# Patient Record
Sex: FEMALE | URBAN - METROPOLITAN AREA
[De-identification: names, ages, dates, MRNs, and addresses within clinical notes are randomized per-mention and may not be internally consistent; named-entity substitution may affect disease eponyms.]

---

## 2024-03-14 ENCOUNTER — HOSPITAL ENCOUNTER (OUTPATIENT)
Age: 29
Setting detail: SPECIMEN
Discharge: HOME | End: 2024-03-14

## 2024-03-14 ENCOUNTER — OFFICE VISIT (OUTPATIENT)
Dept: OBGYN CLINIC | Age: 29
End: 2024-03-14

## 2024-03-14 VITALS
SYSTOLIC BLOOD PRESSURE: 116 MMHG | DIASTOLIC BLOOD PRESSURE: 80 MMHG | BODY MASS INDEX: 36.84 KG/M2 | WEIGHT: 215.8 LBS | HEIGHT: 64 IN

## 2024-03-14 DIAGNOSIS — Z32.01 POSITIVE PREGNANCY TEST: ICD-10-CM

## 2024-03-14 DIAGNOSIS — Z3A.09 9 WEEKS GESTATION OF PREGNANCY: ICD-10-CM

## 2024-03-14 DIAGNOSIS — Z32.01 POSITIVE PREGNANCY TEST: Primary | ICD-10-CM

## 2024-03-14 LAB
ABO + RH BLD: NORMAL
BLOOD GROUP ANTIBODIES SERPL: NEGATIVE
CANDIDA SPECIES: NEGATIVE
GARDNERELLA VAGINALIS: NEGATIVE
HGB ELECTROPHORESIS INTERP: NORMAL
HIV 1+2 AB+HIV1 P24 AG SERPL QL IA: NONREACTIVE
PATHOLOGIST: NORMAL
SOURCE: NORMAL
TRICHOMONAS: NEGATIVE

## 2024-03-14 PROCEDURE — 0500F INITIAL PRENATAL CARE VISIT: CPT | Performed by: NURSE PRACTITIONER

## 2024-03-14 NOTE — PROGRESS NOTES
Chaperone for Intimate Exam  Chaperone was offered as part of the rooming process. Patient declined and agrees to continue with exam without a chaperone.  Chaperone: NONE       
are not limited to increased risks of  labor,  delivery, premature rupture of membranes, intrauterine growth restriction, intrauterine fetal demise and abruptio placenta. Secondary smoke risks were also reviewed. Increases in cancer, respiratory problems, and sudden infant death syndrome were reviewed as well.    Prenatal screening was discussed, including cell free DNA tests. Benefits of the above testing was reviewed. Risks, Benefits and non-invasive alternative testing was reviewed.     The patient was questioned in detail regarding any genetic misnomer history, chromosomal abnormalities, or learning disabilities in  herself, the father of the baby or their families. SHE DENIED ANY HISTORY AS STATED ABOVE: YES    Upon completion of the visit all questions were answered and the patients follow-up and testing schedule were reviewed.      LUCILA Hemphill CNP Ob/Gyn   3/14/2024, 3:07 PM

## 2024-03-15 LAB
BASOPHILS # BLD: 0.04 K/UL (ref 0–0.2)
BASOPHILS NFR BLD: 1 % (ref 0–2)
C TRACH DNA SPEC QL PROBE+SIG AMP: NEGATIVE
EOSINOPHIL # BLD: 0.04 K/UL (ref 0–0.44)
EOSINOPHILS RELATIVE PERCENT: 1 % (ref 1–4)
ERYTHROCYTE [DISTWIDTH] IN BLOOD BY AUTOMATED COUNT: 13.3 % (ref 11.8–14.4)
HBV SURFACE AG SERPL QL IA: NONREACTIVE
HCT VFR BLD AUTO: 39.8 % (ref 36.3–47.1)
HCV AB SERPL QL IA: NONREACTIVE
HGB BLD-MCNC: 13 G/DL (ref 11.9–15.1)
IMM GRANULOCYTES # BLD AUTO: <0.03 K/UL (ref 0–0.3)
IMM GRANULOCYTES NFR BLD: 0 %
LYMPHOCYTES NFR BLD: 2.44 K/UL (ref 1.1–3.7)
LYMPHOCYTES RELATIVE PERCENT: 35 % (ref 24–43)
MCH RBC QN AUTO: 27.2 PG (ref 25.2–33.5)
MCHC RBC AUTO-ENTMCNC: 32.7 G/DL (ref 28.4–34.8)
MCV RBC AUTO: 83.3 FL (ref 82.6–102.9)
MONOCYTES NFR BLD: 0.44 K/UL (ref 0.1–1.2)
MONOCYTES NFR BLD: 6 % (ref 3–12)
N GONORRHOEA DNA SPEC QL PROBE+SIG AMP: NEGATIVE
NEUTROPHILS NFR BLD: 57 % (ref 36–65)
NEUTS SEG NFR BLD: 3.97 K/UL (ref 1.5–8.1)
NRBC BLD-RTO: 0 PER 100 WBC
PLATELET # BLD AUTO: 303 K/UL (ref 138–453)
PMV BLD AUTO: 10.5 FL (ref 8.1–13.5)
RBC # BLD AUTO: 4.78 M/UL (ref 3.95–5.11)
RUBV IGG SERPL QL IA: >500 IU/ML
SPECIMEN DESCRIPTION: NORMAL
T PALLIDUM AB SER QL IA: NONREACTIVE
WBC OTHER # BLD: 6.9 K/UL (ref 3.5–11.3)

## 2024-03-18 LAB
HGB ELECTROPHORESIS INTERP: NORMAL
PATHOLOGIST: NORMAL

## 2024-03-21 LAB
CFTR ALLELE 1 BLD/T QL: NEGATIVE
CFTR ALLELE 2 BLD/T QL: NEGATIVE
CFTR MUT ANL BLD/T: NORMAL
CFTR MUT ANL BLD/T: NORMAL

## 2024-03-25 ENCOUNTER — HOSPITAL ENCOUNTER (OUTPATIENT)
Age: 29
Setting detail: SPECIMEN
Discharge: HOME | End: 2024-03-25

## 2024-03-25 ENCOUNTER — INITIAL PRENATAL (OUTPATIENT)
Dept: OBGYN CLINIC | Age: 29
End: 2024-03-25

## 2024-03-25 VITALS
SYSTOLIC BLOOD PRESSURE: 104 MMHG | DIASTOLIC BLOOD PRESSURE: 68 MMHG | HEIGHT: 64 IN | WEIGHT: 215 LBS | BODY MASS INDEX: 36.7 KG/M2

## 2024-03-25 DIAGNOSIS — Z3A.10 10 WEEKS GESTATION OF PREGNANCY: ICD-10-CM

## 2024-03-25 DIAGNOSIS — Z32.01 POSITIVE PREGNANCY TEST: ICD-10-CM

## 2024-03-25 DIAGNOSIS — Z76.89 ENCOUNTER TO ESTABLISH CARE: Primary | ICD-10-CM

## 2024-03-25 DIAGNOSIS — Z34.01 PRIMIPARITY, FIRST TRIMESTER: ICD-10-CM

## 2024-03-25 DIAGNOSIS — Z82.0 FAMILY HISTORY OF MS (MULTIPLE SCLEROSIS): ICD-10-CM

## 2024-03-25 DIAGNOSIS — Z3A.09 9 WEEKS GESTATION OF PREGNANCY: ICD-10-CM

## 2024-03-25 LAB
ALBUMIN SERPL-MCNC: 4.1 G/DL (ref 3.5–5.2)
ALBUMIN/GLOB SERPL: 2 {RATIO} (ref 1–2.5)
ALP SERPL-CCNC: 62 U/L (ref 35–104)
ALT SERPL-CCNC: 19 U/L (ref 10–35)
AMPHET UR QL SCN: NEGATIVE
ANION GAP SERPL CALCULATED.3IONS-SCNC: 10 MMOL/L (ref 9–16)
AST SERPL-CCNC: 16 U/L (ref 10–35)
BACTERIA URNS QL MICRO: NORMAL
BARBITURATES UR QL SCN: NEGATIVE
BENZODIAZ UR QL: NEGATIVE
BILIRUB SERPL-MCNC: 0.3 MG/DL (ref 0–1.2)
BILIRUB UR QL STRIP: NEGATIVE
BUN SERPL-MCNC: 8 MG/DL (ref 6–20)
CALCIUM SERPL-MCNC: 9.3 MG/DL (ref 8.6–10.4)
CANNABINOIDS UR QL SCN: NEGATIVE
CASTS #/AREA URNS LPF: NORMAL /LPF (ref 0–8)
CHLORIDE SERPL-SCNC: 103 MMOL/L (ref 98–107)
CLARITY UR: CLEAR
CO2 SERPL-SCNC: 23 MMOL/L (ref 20–31)
COCAINE UR QL SCN: NEGATIVE
COLOR UR: YELLOW
CREAT SERPL-MCNC: 0.7 MG/DL (ref 0.5–0.9)
CREAT UR-MCNC: 153 MG/DL (ref 28–217)
EPI CELLS #/AREA URNS HPF: NORMAL /HPF (ref 0–5)
FENTANYL UR QL: NEGATIVE
GFR SERPL CREATININE-BSD FRML MDRD: >90 ML/MIN/1.73M2
GLUCOSE SERPL-MCNC: 87 MG/DL (ref 74–99)
GLUCOSE UR STRIP-MCNC: NEGATIVE MG/DL
HGB UR QL STRIP.AUTO: NEGATIVE
KETONES UR STRIP-MCNC: NEGATIVE MG/DL
LEUKOCYTE ESTERASE UR QL STRIP: NEGATIVE
METHADONE UR QL: NEGATIVE
NITRITE UR QL STRIP: NEGATIVE
OPIATES UR QL SCN: NEGATIVE
OXYCODONE UR QL SCN: NEGATIVE
PCP UR QL SCN: NEGATIVE
PH UR STRIP: 5.5 [PH] (ref 5–8)
POTASSIUM SERPL-SCNC: 4.6 MMOL/L (ref 3.7–5.3)
PROT SERPL-MCNC: 6.8 G/DL (ref 6.6–8.7)
PROT UR STRIP-MCNC: NEGATIVE MG/DL
RBC #/AREA URNS HPF: NORMAL /HPF (ref 0–4)
SODIUM SERPL-SCNC: 136 MMOL/L (ref 136–145)
SP GR UR STRIP: 1.02 (ref 1–1.03)
TEST INFORMATION: NORMAL
TOTAL PROTEIN, URINE: 8 MG/DL
URINE TOTAL PROTEIN CREATININE RATIO: 0.05
UROBILINOGEN UR STRIP-ACNC: NORMAL EU/DL (ref 0–1)
WBC #/AREA URNS HPF: NORMAL /HPF (ref 0–5)

## 2024-03-25 PROCEDURE — 0500F INITIAL PRENATAL CARE VISIT: CPT | Performed by: OBSTETRICS & GYNECOLOGY

## 2024-03-25 RX ORDER — CALCIUM CARBONATE 500 MG/1
1 TABLET, CHEWABLE ORAL DAILY
COMMUNITY

## 2024-03-25 NOTE — PROGRESS NOTES
Relationship with FOB: fiance, living together, first pregnancy together, no other children  Partner's name:Mark  Plans to Breast or bottle:undecided   Pain Score:0/10  Job title:  This is a planned pregnancy:Yes  Certain LMP:Yes   S/S of pregnancy:Yes, nausea   Hx N/V pregnancy: Nausea mild mod and has resolved.       Mother's ethnicity: /   Father's ethnicity:        There is no problem list on file for this patient.    Blood pressure 104/68, height 1.626 m (5' 4\"), weight 97.5 kg (215 lb), last menstrual period 2024.      Jennifer Ramirez is a 28 y.o. , here for her ACOG. The patients past medical, surgical, social and family history were reviewed.  Current medications and allergies were reviewed, and documented in the chart.    Menstrual history: regular  Birth control: BCP.     Wt Readings from Last 3 Encounters:   24 97.5 kg (215 lb)   24 97.9 kg (215 lb 12.8 oz)     Recent Results (from the past 8736 hour(s))   Hepatitis C Antibody    Collection Time: 24  3:10 PM   Result Value Ref Range    Hepatitis C Ab NONREACTIVE NONREACTIVE   HIV Screen    Collection Time: 24  3:10 PM   Result Value Ref Range    HIV Ag/Ab NONREACTIVE NONREACTIVE   Prenatal Profile I    Collection Time: 24  3:10 PM   Result Value Ref Range    WBC 6.9 3.5 - 11.3 k/uL    RBC 4.78 3.95 - 5.11 m/uL    Hemoglobin 13.0 11.9 - 15.1 g/dL    Hematocrit 39.8 36.3 - 47.1 %    MCV 83.3 82.6 - 102.9 fL    MCH 27.2 25.2 - 33.5 pg    MCHC 32.7 28.4 - 34.8 g/dL    RDW 13.3 11.8 - 14.4 %    Platelets 303 138 - 453 k/uL    MPV 10.5 8.1 - 13.5 fL    NRBC Automated 0.0 0.0 per 100 WBC    Neutrophils % 57 36 - 65 %    Lymphocytes % 35 24 - 43 %    Monocytes % 6 3 - 12 %    Eosinophils % 1 1 - 4 %    Basophils % 1 0 - 2 %    Immature Granulocytes 0 0 %    Neutrophils Absolute 3.97 1.50 - 8.10 k/uL    Lymphocytes Absolute 2.44 1.10 - 3.70 k/uL    Monocytes Absolute 0.44

## 2024-03-26 ENCOUNTER — TELEPHONE (OUTPATIENT)
Dept: OBGYN CLINIC | Age: 29
End: 2024-03-26

## 2024-03-26 LAB
MICROORGANISM SPEC CULT: NO GROWTH
SPECIMEN DESCRIPTION: NORMAL

## 2024-03-26 NOTE — TELEPHONE ENCOUNTER
LMP: 01/09/24    11 wks    Last Seen: 03/26/24    Next Appt: 04/22/24    C/O  Small blood clot after BM   Pt unsure if from rectum or vagina   No cramps    Informed pt some spotting can be normal in pregnancy and would f/u w/ provider for any other recommendations.    Advised pt to monitor and reach out to office w/ any changes in symptoms. Pt can take warm bath or use a moderate to low heating pad in intervals for body aches and pains if present. Increase water intake w/ electrolytes.        Please Advise

## 2024-03-27 NOTE — TELEPHONE ENCOUNTER
Pt aware, pt asked if it would be okay to take fiber pill or if she should focus on more natural based fibers.       Advised pt she could send us the name of fiber pill to ensure that it is adequate.

## 2024-03-31 LAB
Lab: NORMAL
NTRA FETAL FRACTION: NORMAL
NTRA GENDER OF FETUS: NORMAL
NTRA MONOSOMY X AGE-BASED RISK TEXT: NORMAL
NTRA MONOSOMY X RESULT TEXT: NORMAL
NTRA MONOSOMY X RISK SCORE TEXT: NORMAL
NTRA TRIPLOIDY RESULT TEXT: NORMAL
NTRA TRISOMY 13 AGE-BASED RISK TEXT: NORMAL
NTRA TRISOMY 13 RESULT TEXT: NORMAL
NTRA TRISOMY 13 RISK SCORE TEXT: NORMAL
NTRA TRISOMY 18 AGE-BASED RISK TEXT: NORMAL
NTRA TRISOMY 18 RESULT TEXT: NORMAL
NTRA TRISOMY 18 RISK SCORE TEXT: NORMAL
NTRA TRISOMY 21 AGE-BASED RISK TEXT: NORMAL
NTRA TRISOMY 21 RESULT TEXT: NORMAL
NTRA TRISOMY 21 RISK SCORE TEXT: NORMAL

## 2024-04-08 ENCOUNTER — TELEPHONE (OUTPATIENT)
Dept: OBGYN CLINIC | Age: 29
End: 2024-04-08

## 2024-04-08 NOTE — TELEPHONE ENCOUNTER
LMP 1/9/24    Last Seen:3/25/24    Next Appt: 4/22/24        Patient says when she was here for her last visit she wasn't sure if she was told to take a baby aspirin everyday.    Please advise.

## 2024-04-19 NOTE — PROGRESS NOTES
Prenatal Visit    Jennifer Ramirez is a 28 y.o. female  at 14w6d    Subjective:  The patient was seen and evaluated. Reports Negative fetal movements. She denies abdominal pain, vaginal bleeding and leakage of fluid. Signs and symptoms of  labor as well as labor were reviewed. Dates were reviewed with the patient. Estimated Date of Delivery: 10/15/24          The patient declined the influenza vaccine this year.     The problem list reflects the active issues addressed during today's visit    Her only concern is fatigue    VITALS:    BP: 115/75  Weight - Scale: 98.2 kg (216 lb 6.4 oz)  Fetal HR: 156  Movement: Absent       Assessment & Plan:  Jennifer Ramirez is a 28 y.o. female  at 14w6d   - An 18-22 week anatomy ultrasound has been ordered   - NIPT low risk, male   - Continue taking Prenatal Vitamin.   - continue aspirin 81 mg daily    - The ACIP recommended pregnant patients be included in phase 1C of vaccine distribution. This decision is supported by [facility] and ACOG. As of 2021, there have been over 30,000 pregnant patients included in the V-safe post COVID vaccination safety . Most (73%) reports to VAERS among pregnant women involved non-pregnancyspecific adverse events (e.g., local and systemic reactions). Miscarriage was the most frequently reported pregnancy-specific adverse event to VAERS; numbers are within the known background rates based on presumed COVID-19 vaccine doses administered to pregnant women. No unexpected pregnancy or infant outcomes have been observed related to  COVID-19 vaccination during pregnancy. Recommended patient proceed with vaccination.       Patient Active Problem List    Diagnosis Date Noted    Obesity (BMI 35.0-39.9 without comorbidity) 2024     Aspirin 81 mg daily  NSTs at 37 weeks       Return in about 4 weeks (around 2024) for Repeat OB.    LUCILA Hemphill - CNP  [facility]   2024, 7:50 AM

## 2024-04-22 ENCOUNTER — ROUTINE PRENATAL (OUTPATIENT)
Dept: OBGYN CLINIC | Age: 29
End: 2024-04-22

## 2024-04-22 VITALS — WEIGHT: 216.4 LBS | BODY MASS INDEX: 37.14 KG/M2 | SYSTOLIC BLOOD PRESSURE: 115 MMHG | DIASTOLIC BLOOD PRESSURE: 75 MMHG

## 2024-04-22 DIAGNOSIS — E66.9 OBESITY (BMI 35.0-39.9 WITHOUT COMORBIDITY): ICD-10-CM

## 2024-04-22 DIAGNOSIS — Z34.02 ENCOUNTER FOR SUPERVISION OF NORMAL FIRST PREGNANCY IN SECOND TRIMESTER: Primary | ICD-10-CM

## 2024-04-22 DIAGNOSIS — Z3A.14 14 WEEKS GESTATION OF PREGNANCY: ICD-10-CM

## 2024-04-22 PROCEDURE — 0502F SUBSEQUENT PRENATAL CARE: CPT | Performed by: NURSE PRACTITIONER

## 2024-04-22 RX ORDER — ASPIRIN 81 MG/1
81 TABLET, CHEWABLE ORAL DAILY
COMMUNITY

## 2024-05-17 NOTE — PROGRESS NOTES
Prenatal Visit    Jennifer Ramirez is a 28 y.o. female  at 18w6d    The patient was seen and evaluated. She complains of not sleeping well however that was a problem outside of pregnancy as well. She reports positive fetal movements. She denies contractions, vaginal bleeding and leakage of fluid. Signs and symptoms of labor and pre-eclampsia were reviewed with the patient in detail. She is to report any of these if they occur. She currently denies signs or symptoms of pre-eclampsia including headache, vision changes, RUQ pain.    The problem list reflects the active issues addressed during today's visit    Vitals:  Vitals:    24 0859   BP: 109/64   Site: Left Upper Arm   Position: Sitting   Cuff Size: Large Adult   Weight: 98 kg (216 lb)       BP: 109/64  Weight - Scale: 98 kg (216 lb)       Assessment & Plan:  Jennifer Ramirez is a 28 y.o. female  at 18w6d   - An 18-22 week anatomy ultrasound has been ordered- scheduled for 6/3   - Discussed Melatonin or Benadryl prn for sleep as well as pregnancy pillow.     Patient Active Problem List    Diagnosis Date Noted    Obesity (BMI 35.0-39.9 without comorbidity) 2024     Aspirin 81 mg daily  NSTs at 37 weeks           AYE CARRERA DO  Ob/Gyn   [facility], [place]  2024, 9:11 AM

## 2024-05-20 ENCOUNTER — ROUTINE PRENATAL (OUTPATIENT)
Dept: OBGYN CLINIC | Age: 29
End: 2024-05-20

## 2024-05-20 VITALS — WEIGHT: 216 LBS | DIASTOLIC BLOOD PRESSURE: 64 MMHG | BODY MASS INDEX: 37.08 KG/M2 | SYSTOLIC BLOOD PRESSURE: 109 MMHG

## 2024-05-20 DIAGNOSIS — Z3A.18 18 WEEKS GESTATION OF PREGNANCY: Primary | ICD-10-CM

## 2024-05-20 PROCEDURE — 0502F SUBSEQUENT PRENATAL CARE: CPT | Performed by: OBSTETRICS & GYNECOLOGY

## 2024-05-23 NOTE — TELEPHONE ENCOUNTER
Dr. Rosado, patient interested in free prenatal vitamins and iron per response from Maternity Risk Survey.    Order for BS Baby Box pending for your convenience.    Thank you,  Karen Horton, PharmD  Ambulatory Care Clinical Pharmacist- [facility] Clinical Pharmacy  Department, toll free: [phone]  [facility]      =======================================================================    [facility] CLINICAL PHARMACY REVIEW - Be Well With Baby    SUBJECTIVE  Jennifer Ramirez is a 28 y.o. female currently identified as interested in receiving a [facility] \"Baby Box\" containing a 1 year supply of prenatal vitamins from [facility].    Contents of Baby Box:  Welcome Letter  6 month supply of Nature's Blend Prenatal Multivitamin with Minerals (Take 1 softgel once daily) with 1 refill    Thank you  Karen Horton, PharmALBERT  Ambulatory Care Clinical Pharmacist- [facility] Clinical Pharmacy  Department, toll free: [phone]  [facility]

## 2024-06-03 ENCOUNTER — ROUTINE PRENATAL (OUTPATIENT)
Dept: PERINATAL CARE | Age: 29
End: 2024-06-03
Payer: COMMERCIAL

## 2024-06-03 ENCOUNTER — TELEPHONE (OUTPATIENT)
Dept: OBGYN CLINIC | Age: 29
End: 2024-06-03

## 2024-06-03 VITALS
HEIGHT: 64 IN | RESPIRATION RATE: 16 BRPM | DIASTOLIC BLOOD PRESSURE: 68 MMHG | WEIGHT: 217 LBS | TEMPERATURE: 98.1 F | HEART RATE: 80 BPM | SYSTOLIC BLOOD PRESSURE: 114 MMHG | BODY MASS INDEX: 37.05 KG/M2

## 2024-06-03 DIAGNOSIS — E66.9 OBESITY (BMI 35.0-39.9 WITHOUT COMORBIDITY): ICD-10-CM

## 2024-06-03 DIAGNOSIS — Z36.86 ENCOUNTER FOR SCREENING FOR RISK OF PRE-TERM LABOR: ICD-10-CM

## 2024-06-03 DIAGNOSIS — O28.3 NUCHAL FOLD THICKENING ON PRENATAL ULTRASOUND: Primary | ICD-10-CM

## 2024-06-03 DIAGNOSIS — Z3A.20 20 WEEKS GESTATION OF PREGNANCY: ICD-10-CM

## 2024-06-03 DIAGNOSIS — O99.212 OBESITY AFFECTING PREGNANCY IN SECOND TRIMESTER, UNSPECIFIED OBESITY TYPE: ICD-10-CM

## 2024-06-03 PROCEDURE — 99999 PR OFFICE/OUTPT VISIT,PROCEDURE ONLY: CPT | Performed by: OBSTETRICS & GYNECOLOGY

## 2024-06-03 PROCEDURE — 76811 OB US DETAILED SNGL FETUS: CPT | Performed by: OBSTETRICS & GYNECOLOGY

## 2024-06-03 PROCEDURE — 76817 TRANSVAGINAL US OBSTETRIC: CPT | Performed by: OBSTETRICS & GYNECOLOGY

## 2024-06-03 NOTE — TELEPHONE ENCOUNTER
20.6wks     Pt calling in asking if you could explain what her results mean in more detail that she had done at  office as a resident came and talked to her but did not explain anything well to her in regards to the Thickened Nuchal fold that was found on her US today. Pt states they were not very helpful and that she called their office for more detailed information and they told her to call our office for her answers.     Pls advise.

## 2024-06-03 NOTE — PROGRESS NOTES
Obstetric/Gynecology Maternal Fetal Medicine Resident Note    Patient seen in MFM office today for new findings of thickened nuchal fold on scan today. Patient has opted for formal consultation.     Lupe Ojeda MD  OBGYN Resident, PGY1  [facility]  6/3/2024, 12:12 PM

## 2024-06-06 ENCOUNTER — TELEPHONE (OUTPATIENT)
Dept: OBGYN CLINIC | Age: 29
End: 2024-06-06

## 2024-06-06 NOTE — TELEPHONE ENCOUNTER
Bonnie from [facility] is stating patient is wanting a second opinion from them. She was told by Keira's office baby has a thickened Nuchal fold found on ultrasound. Should I put in a referral?

## 2024-06-18 ENCOUNTER — ROUTINE PRENATAL (OUTPATIENT)
Dept: OBGYN CLINIC | Age: 29
End: 2024-06-18

## 2024-06-18 VITALS
DIASTOLIC BLOOD PRESSURE: 80 MMHG | SYSTOLIC BLOOD PRESSURE: 120 MMHG | BODY MASS INDEX: 37.42 KG/M2 | HEIGHT: 64 IN | WEIGHT: 219.2 LBS

## 2024-06-18 DIAGNOSIS — E66.01 SEVERE OBESITY DUE TO EXCESS CALORIES AFFECTING PREGNANCY IN SECOND TRIMESTER (HCC): ICD-10-CM

## 2024-06-18 DIAGNOSIS — O09.92 SUPERVISION OF HIGH RISK PREGNANCY IN SECOND TRIMESTER: ICD-10-CM

## 2024-06-18 DIAGNOSIS — Z3A.23 23 WEEKS GESTATION OF PREGNANCY: ICD-10-CM

## 2024-06-18 DIAGNOSIS — O28.3 NUCHAL FOLD THICKENING ON PRENATAL ULTRASOUND: Primary | ICD-10-CM

## 2024-06-18 DIAGNOSIS — O99.212 SEVERE OBESITY DUE TO EXCESS CALORIES AFFECTING PREGNANCY IN SECOND TRIMESTER (HCC): ICD-10-CM

## 2024-06-18 PROBLEM — E66.9 OBESITY (BMI 35.0-39.9 WITHOUT COMORBIDITY): Status: RESOLVED | Noted: 2024-04-22 | Resolved: 2024-06-18

## 2024-06-18 PROCEDURE — 0502F SUBSEQUENT PRENATAL CARE: CPT | Performed by: STUDENT IN AN ORGANIZED HEALTH CARE EDUCATION/TRAINING PROGRAM

## 2024-06-18 NOTE — PROGRESS NOTES
vaccination during pregnancy. Recommended patient proceed with vaccination.    -Patient will decide if she would like to follow-up with her future scans in the maternal-fetal medicine office or switch offices.  Will place referral should patient desire.          Patient Active Problem List    Diagnosis Date Noted    BMI 37 06/18/2024     ASA 81 mg. NSTs at 37 weeks      Nuchal fold thickening on prenatal ultrasound 06/18/2024     NIPT Low Risk       Return in about 4 weeks (around 7/16/2024) for DO Barby Jamil Ob/Gyn   6/18/2024, 11:47 AM

## 2024-07-03 ENCOUNTER — ROUTINE PRENATAL (OUTPATIENT)
Dept: PERINATAL CARE | Age: 29
End: 2024-07-03

## 2024-07-03 VITALS
TEMPERATURE: 97.9 F | DIASTOLIC BLOOD PRESSURE: 76 MMHG | WEIGHT: 220 LBS | RESPIRATION RATE: 16 BRPM | BODY MASS INDEX: 37.56 KG/M2 | SYSTOLIC BLOOD PRESSURE: 117 MMHG | HEIGHT: 64 IN

## 2024-07-03 DIAGNOSIS — O28.3 NUCHAL FOLD THICKENING ON PRENATAL ULTRASOUND: Primary | ICD-10-CM

## 2024-07-03 DIAGNOSIS — Z3A.25 25 WEEKS GESTATION OF PREGNANCY: ICD-10-CM

## 2024-07-13 NOTE — PROGRESS NOTES
Jennifer is a  @ 26w6d who presents for TALYA visit.  She denies LOF, VB or Ctxs.  + FM.  She denies any complaints except for some occasional cramping.  She denies any fevers/chills, SOB, cough, sore throat, loss of taste/smell or sick contacts.  Pt denies any HA, vision changes or RUQ pain.     O:  Vitals:    07/15/24 0804   BP: 117/82   Pulse: (!) 102     Gen: NAD  Abd: soft, nontender, gravid  Ext:  no edema      BP: 117/82  Weight - Scale: 100.2 kg (221 lb)  Pulse: (!) 102  Patient Position: Sitting  Fundal Height (cm): 26 cm  Fetal HR: 155  Movement: Present    A/P:  Patient Active Problem List    Diagnosis Date Noted    BMI 37 2024     ASA 81 mg. NSTs at 37 weeks      Nuchal fold thickening on prenatal ultrasound 2024     NIPT Low Risk       - Discussed updated COVID precautions and policies. Reviewed updated visitor policy. Encouraged social distancing and appropriate hand washing/hygiene practices. Reviewed symptoms suspicious for COVID infection. Discussed that ACOG, SMFM, and the CDC recommend to not withold immunization in pregnant and breastfeeding women who meet criteria for receipt of the vaccine based on the ACIP recommended priority groups. All questions answered. Patient vocalized understanding.    - RSV vaccination (32-36 weeks): The patient was counseled on benefits to her baby if she receives the Pfizer RSV vaccine (Abrysvo) during pregnancy. She was informed that this vaccination is FDA approved for use during pregnancy. She will think about it and call local pharmacies       1 hr, CBC, TPal, UC ordered  Echo with MFM today  Discussed s/sx that should prompt call to the office  Discussed kick counts  Pt counseled to continue PNVs  RTC in 2 wks    Jazmyne Burnham MD

## 2024-07-15 ENCOUNTER — ROUTINE PRENATAL (OUTPATIENT)
Dept: OBGYN CLINIC | Age: 29
End: 2024-07-15

## 2024-07-15 ENCOUNTER — ROUTINE PRENATAL (OUTPATIENT)
Dept: PERINATAL CARE | Age: 29
End: 2024-07-15
Payer: COMMERCIAL

## 2024-07-15 ENCOUNTER — HOSPITAL ENCOUNTER (OUTPATIENT)
Age: 29
Setting detail: SPECIMEN
Discharge: HOME | End: 2024-07-15

## 2024-07-15 VITALS
HEART RATE: 102 BPM | BODY MASS INDEX: 37.93 KG/M2 | SYSTOLIC BLOOD PRESSURE: 117 MMHG | WEIGHT: 221 LBS | DIASTOLIC BLOOD PRESSURE: 82 MMHG

## 2024-07-15 VITALS
SYSTOLIC BLOOD PRESSURE: 109 MMHG | DIASTOLIC BLOOD PRESSURE: 62 MMHG | WEIGHT: 221.78 LBS | HEART RATE: 83 BPM | BODY MASS INDEX: 37.86 KG/M2 | HEIGHT: 64 IN | TEMPERATURE: 98.2 F | RESPIRATION RATE: 16 BRPM

## 2024-07-15 DIAGNOSIS — Z36.4 ULTRASOUND FOR ANTENATAL SCREENING FOR FETAL GROWTH RESTRICTION: ICD-10-CM

## 2024-07-15 DIAGNOSIS — Z3A.26 26 WEEKS GESTATION OF PREGNANCY: ICD-10-CM

## 2024-07-15 DIAGNOSIS — O09.92 SUPERVISION OF HIGH RISK PREGNANCY IN SECOND TRIMESTER: ICD-10-CM

## 2024-07-15 DIAGNOSIS — Z3A.26 26 WEEKS GESTATION OF PREGNANCY: Primary | ICD-10-CM

## 2024-07-15 DIAGNOSIS — O28.3 NUCHAL FOLD THICKENING ON PRENATAL ULTRASOUND: Primary | ICD-10-CM

## 2024-07-15 DIAGNOSIS — O99.212 OBESITY AFFECTING PREGNANCY IN SECOND TRIMESTER, UNSPECIFIED OBESITY TYPE: ICD-10-CM

## 2024-07-15 LAB
BASOPHILS # BLD: 0.03 K/UL (ref 0–0.2)
BASOPHILS NFR BLD: 0 % (ref 0–2)
EOSINOPHIL # BLD: 0.06 K/UL (ref 0–0.44)
EOSINOPHILS RELATIVE PERCENT: 1 % (ref 1–4)
ERYTHROCYTE [DISTWIDTH] IN BLOOD BY AUTOMATED COUNT: 13.2 % (ref 11.8–14.4)
GLUCOSE 1H P 50 G GLC PO SERPL-MCNC: 89 MG/DL (ref 70–135)
GLUCOSE ADMINISTRATION: NORMAL
HCT VFR BLD AUTO: 36.3 % (ref 36.3–47.1)
HGB BLD-MCNC: 11.8 G/DL (ref 11.9–15.1)
IMM GRANULOCYTES # BLD AUTO: 0.03 K/UL (ref 0–0.3)
IMM GRANULOCYTES NFR BLD: 0 %
LYMPHOCYTES NFR BLD: 1.81 K/UL (ref 1.1–3.7)
LYMPHOCYTES RELATIVE PERCENT: 19 % (ref 24–43)
MCH RBC QN AUTO: 26.5 PG (ref 25.2–33.5)
MCHC RBC AUTO-ENTMCNC: 32.5 G/DL (ref 28.4–34.8)
MCV RBC AUTO: 81.4 FL (ref 82.6–102.9)
MONOCYTES NFR BLD: 0.44 K/UL (ref 0.1–1.2)
MONOCYTES NFR BLD: 5 % (ref 3–12)
NEUTROPHILS NFR BLD: 75 % (ref 36–65)
NEUTS SEG NFR BLD: 7.44 K/UL (ref 1.5–8.1)
NRBC BLD-RTO: 0 PER 100 WBC
PLATELET # BLD AUTO: 255 K/UL (ref 138–453)
PMV BLD AUTO: 11.4 FL (ref 8.1–13.5)
RBC # BLD AUTO: 4.46 M/UL (ref 3.95–5.11)
RBC # BLD: ABNORMAL 10*6/UL
T PALLIDUM AB SER QL IA: NONREACTIVE
WBC OTHER # BLD: 9.8 K/UL (ref 3.5–11.3)

## 2024-07-15 PROCEDURE — 76825 ECHO EXAM OF FETAL HEART: CPT | Performed by: OBSTETRICS & GYNECOLOGY

## 2024-07-15 PROCEDURE — 76816 OB US FOLLOW-UP PER FETUS: CPT | Performed by: OBSTETRICS & GYNECOLOGY

## 2024-07-15 PROCEDURE — 76827 ECHO EXAM OF FETAL HEART: CPT | Performed by: OBSTETRICS & GYNECOLOGY

## 2024-07-15 PROCEDURE — 99999 PR OFFICE/OUTPT VISIT,PROCEDURE ONLY: CPT | Performed by: OBSTETRICS & GYNECOLOGY

## 2024-07-15 PROCEDURE — 0502F SUBSEQUENT PRENATAL CARE: CPT | Performed by: OBSTETRICS & GYNECOLOGY

## 2024-07-15 PROCEDURE — 93325 DOPPLER ECHO COLOR FLOW MAPG: CPT | Performed by: OBSTETRICS & GYNECOLOGY

## 2024-07-16 LAB
MICROORGANISM SPEC CULT: NORMAL
SERVICE CMNT-IMP: NORMAL
SPECIMEN DESCRIPTION: NORMAL

## 2024-08-13 NOTE — PROGRESS NOTES
Jennifer is a  @ 31w1d who presents for TALYA visit.  She denies LOF, VB or Ctxs.  + FM.  She is having some BH ctxs.  She is just starting to having some more swelling and discomfort.  She denies any fevers/chills, SOB, cough, sore throat, loss of taste/smell or sick contacts.  Pt denies any HA, vision changes or RUQ pain.     O:  Vitals:    24 0804   BP: 136/83   Pulse: 93     Gen: NAD  Abd: soft, nontender, gravid  Ext:  no edema      BP: 136/83  Weight - Scale: 101.2 kg (223 lb)  Pulse: 93  Patient Position: Sitting  Fundal Height (cm): 31 cm  Fetal HR: 155  Movement: Present    A/P:  Patient Active Problem List    Diagnosis Date Noted    BMI 37 2024     ASA 81 mg. NSTs at 37 weeks      Nuchal fold thickening on prenatal ultrasound 2024     NIPT Low Risk       - Discussed updated COVID precautions and policies. Reviewed updated visitor policy. Encouraged social distancing and appropriate hand washing/hygiene practices. Reviewed symptoms suspicious for COVID infection. Discussed that ACOG, SMFM, and the CDC recommend to not withold immunization in pregnant and breastfeeding women who meet criteria for receipt of the vaccine based on the ACIP recommended priority groups. All questions answered. Patient vocalized understanding.    - RSV vaccination (32-36 weeks): The patient was counseled on benefits to her baby if she receives the Pfizer RSV vaccine (Abrysvo) during pregnancy. She was informed that this vaccination is FDA approved for use during pregnancy. She will think about it and call local pharmacies       Discussed s/sx that should prompt call to the office  Discussed kick counts  Pt counseled to continue PNVs  RTC in 2 wks    Jazmyne Burnham MD

## 2024-08-14 ENCOUNTER — ROUTINE PRENATAL (OUTPATIENT)
Dept: OBGYN CLINIC | Age: 29
End: 2024-08-14

## 2024-08-14 VITALS
DIASTOLIC BLOOD PRESSURE: 83 MMHG | BODY MASS INDEX: 38.28 KG/M2 | WEIGHT: 223 LBS | SYSTOLIC BLOOD PRESSURE: 136 MMHG | HEART RATE: 93 BPM

## 2024-08-14 DIAGNOSIS — Z3A.31 31 WEEKS GESTATION OF PREGNANCY: Primary | ICD-10-CM

## 2024-08-14 DIAGNOSIS — O09.93 SUPERVISION OF HIGH RISK PREGNANCY IN THIRD TRIMESTER: ICD-10-CM

## 2024-08-14 PROCEDURE — 0502F SUBSEQUENT PRENATAL CARE: CPT | Performed by: OBSTETRICS & GYNECOLOGY

## 2024-08-16 ENCOUNTER — CARE COORDINATION (OUTPATIENT)
Dept: OTHER | Facility: CLINIC | Age: 29
End: 2024-08-16

## 2024-08-16 NOTE — CARE COORDINATION
Ambulatory Care Coordination Note    ACM attempted to reach patient for introduction to Associate Care Management related to Maternity CM. HIPAA compliant message left requesting a return phone call at patient convenience.     Plan for follow-up call in 7-10 days      Future Appointments   Date Time Provider Department Center   8/26/2024  3:45 PM ULTRASONOGRAPHER 2 Mat Fetal [facility]   8/28/2024  9:15 AM Ana Virk, DO [facility]   9/11/2024  8:30 AM Ana Virk, DO [facility]   9/23/2024  8:45 AM Renetta Mortensen, DO [facility]   10/1/2024  3:15 PM Renetta Mortensen, DO [facility]   10/1/2024  3:45 PM [facility]   10/7/2024  8:15 AM Jazmyne Burnham MD [facility]   10/7/2024  8:45 AM [facility]   10/14/2024  8:15 AM Jazmyne Burnham MD [facility]   10/14/2024  8:45 AM [facility]   10/21/2024 10:30 AM Renetta Mortensen DO [facility]   10/21/2024 11:00 AM [facility]   10/29/2024  1:00 PM Kristin Stewart, APRN - CNP [facility]       ROSALINA Rice, RN  Associate Care Manager   Cell: [phone]  [email or web address]

## 2024-08-20 ENCOUNTER — HOSPITAL ENCOUNTER (OUTPATIENT)
Age: 29
Discharge: HOME | End: 2024-08-20
Attending: OBSTETRICS & GYNECOLOGY | Admitting: OBSTETRICS & GYNECOLOGY
Payer: COMMERCIAL

## 2024-08-20 ENCOUNTER — TELEPHONE (OUTPATIENT)
Dept: OBGYN CLINIC | Age: 29
End: 2024-08-20

## 2024-08-20 VITALS
HEART RATE: 98 BPM | OXYGEN SATURATION: 97 % | RESPIRATION RATE: 16 BRPM | WEIGHT: 223 LBS | HEIGHT: 64 IN | BODY MASS INDEX: 38.07 KG/M2 | SYSTOLIC BLOOD PRESSURE: 131 MMHG | DIASTOLIC BLOOD PRESSURE: 82 MMHG | TEMPERATURE: 97.9 F

## 2024-08-20 PROBLEM — N88.9 LESION OF CERVIX: Status: ACTIVE | Noted: 2024-08-20

## 2024-08-20 PROBLEM — Z3A.32 32 WEEKS GESTATION OF PREGNANCY: Status: ACTIVE | Noted: 2024-08-20

## 2024-08-20 LAB
BACTERIA URNS QL MICRO: ABNORMAL
BILIRUB UR QL STRIP: NEGATIVE
CANDIDA SPECIES: NEGATIVE
CASTS #/AREA URNS LPF: ABNORMAL /LPF (ref 0–8)
CLARITY UR: CLEAR
COLOR UR: YELLOW
EPI CELLS #/AREA URNS HPF: ABNORMAL /HPF (ref 0–5)
GARDNERELLA VAGINALIS: NEGATIVE
GLUCOSE UR STRIP-MCNC: NEGATIVE MG/DL
HGB UR QL STRIP.AUTO: NEGATIVE
KETONES UR STRIP-MCNC: NEGATIVE MG/DL
LEUKOCYTE ESTERASE UR QL STRIP: NEGATIVE
NITRITE UR QL STRIP: NEGATIVE
PH UR STRIP: 6 [PH] (ref 5–8)
PROT UR STRIP-MCNC: ABNORMAL MG/DL
RBC #/AREA URNS HPF: ABNORMAL /HPF (ref 0–4)
SOURCE: NORMAL
SP GR UR STRIP: 1.02 (ref 1–1.03)
TRICHOMONAS: NEGATIVE
UROBILINOGEN UR STRIP-ACNC: NORMAL EU/DL (ref 0–1)
WBC #/AREA URNS HPF: ABNORMAL /HPF (ref 0–5)

## 2024-08-20 PROCEDURE — 81001 URINALYSIS AUTO W/SCOPE: CPT

## 2024-08-20 PROCEDURE — 87510 GARDNER VAG DNA DIR PROBE: CPT

## 2024-08-20 PROCEDURE — 87491 CHLMYD TRACH DNA AMP PROBE: CPT

## 2024-08-20 PROCEDURE — 87086 URINE CULTURE/COLONY COUNT: CPT

## 2024-08-20 PROCEDURE — 87660 TRICHOMONAS VAGIN DIR PROBE: CPT

## 2024-08-20 PROCEDURE — 87480 CANDIDA DNA DIR PROBE: CPT

## 2024-08-20 PROCEDURE — 87591 N.GONORRHOEAE DNA AMP PROB: CPT

## 2024-08-20 PROCEDURE — 87529 HSV DNA AMP PROBE: CPT

## 2024-08-20 PROCEDURE — 99213 OFFICE O/P EST LOW 20 MIN: CPT

## 2024-08-20 RX ORDER — ACETAMINOPHEN 500 MG
1000 TABLET ORAL EVERY 6 HOURS PRN
Status: DISCONTINUED | OUTPATIENT
Start: 2024-08-20 | End: 2024-08-20 | Stop reason: HOSPADM

## 2024-08-20 RX ORDER — ONDANSETRON 2 MG/ML
4 INJECTION INTRAMUSCULAR; INTRAVENOUS EVERY 6 HOURS PRN
Status: DISCONTINUED | OUTPATIENT
Start: 2024-08-20 | End: 2024-08-20 | Stop reason: HOSPADM

## 2024-08-20 RX ORDER — ONDANSETRON 4 MG/1
4 TABLET, ORALLY DISINTEGRATING ORAL EVERY 8 HOURS PRN
Status: DISCONTINUED | OUTPATIENT
Start: 2024-08-20 | End: 2024-08-20 | Stop reason: HOSPADM

## 2024-08-20 NOTE — H&P
OBSTETRICAL HISTORY AND PHYSICAL  [facility]    Date: 2024       Time: 8:05 PM   Patient Name: Jennifer Ramirez     Patient : 1995  Room/Bed: REC3/REC3-01    Admission Date/Time: 2024  2:49 PM      CC: LOF and cramping     HPI: Jennifer Ramirez is a 28 y.o.  at 32w0d  who presents with complaints of leakage of fluid that started around 0200 this morning. She's unsure if the fluid is discharge or that her water is broken. She also reports some associated mild cramping over the past week. Feels positive fetal movement. Denies vaginal bleeding.     Patient denies any fever, chills, N/V, headaches, vision changes, chest pain, shortness of breath, RUQ pain, abdominal pain, and increased swelling/tenderness in bilateral lower extremities.     DATING:  LMP: Patient's last menstrual period was 2024 (exact date).  Estimated Date of Delivery: 10/15/24   Based on: early ultrasound, at 9 5/7 weeks GA    PREGNANCY RISK FACTORS:  Patient Active Problem List   Diagnosis    BMI 37    Nuchal fold thickening on prenatal ultrasound    32 weeks gestation of pregnancy        Steroids Given In This Pregnancy:  no     REVIEW OF SYSTEMS:  Constitutional: negative fever, negative chills, negative weight changes   HEENT: negative visual disturbances, negative headaches, negative dizziness, negative hearing loss  Breast: Negative breast abnormalities, negative breast lumps, negative nipple discharge  Respiratory: negative dyspnea, negative cough, negative SOB  Cardiovascular: negative chest pain, negative palpitations, negative arrhythmia, negative syncope   Gastrointestinal: positive abdominal cramping, negative RUQ pain, negative N/V, negative diarrhea, negative constipation, negative bowel changes, negative heartburn   Genitourinary: negative dysuria, negative hematuria, negative urinary incontinence, positive vaginal discharge, negative vaginal bleeding or spotting  Dermatological:

## 2024-08-20 NOTE — TELEPHONE ENCOUNTER
Ob pt 32.0wks ANCA 10.15.24 Abo AB+    Pt is going out of town on Thursday afternoon.  Pt has appt at [facility] 8.26.24 for f/u    Pt noticed some fluid leakage, more than normal.   This is pts first pregnancy, she does work at [employer], pt has not had any recent intercourse, she states, sometimes clear and odorless and sometime a poss urine smell, with contractions/ mumtaz guillen.     Pt aware this could be normal, pt looking for clarification or guidance before going out of town.    Please advise.

## 2024-08-20 NOTE — FLOWSHEET NOTE
Discharge Paperwork discussed with Pt. Pt. Verbalizes understand with no questions. Pt. ambulated off unit home.

## 2024-08-20 NOTE — FLOWSHEET NOTE
Pt. Presented to L&D for SROM which started around 0200 8/20/24. Pt. Has neg bloody-show, negative CTX, Positive FM.

## 2024-08-21 LAB
C TRACH DNA SPEC QL PROBE+SIG AMP: NEGATIVE
HSV1 DNA SPEC QL NAA+PROBE: NEGATIVE
HSV2 DNA SPEC QL NAA+PROBE: NEGATIVE
MICROORGANISM SPEC CULT: NORMAL
N GONORRHOEA DNA SPEC QL PROBE+SIG AMP: NEGATIVE
SERVICE CMNT-IMP: NORMAL
SPECIMEN DESCRIPTION: NORMAL

## 2024-08-21 NOTE — TELEPHONE ENCOUNTER
Pt works at [employer] so she went to be evaluated as she felt it was more leakage and less like discharge

## 2024-08-26 ENCOUNTER — ROUTINE PRENATAL (OUTPATIENT)
Dept: PERINATAL CARE | Age: 29
End: 2024-08-26
Payer: COMMERCIAL

## 2024-08-26 VITALS
RESPIRATION RATE: 16 BRPM | BODY MASS INDEX: 38.96 KG/M2 | SYSTOLIC BLOOD PRESSURE: 126 MMHG | DIASTOLIC BLOOD PRESSURE: 76 MMHG | HEIGHT: 64 IN | WEIGHT: 228.18 LBS | TEMPERATURE: 97.5 F | HEART RATE: 83 BPM

## 2024-08-26 DIAGNOSIS — Z36.3 ENCOUNTER FOR ROUTINE SCREENING FOR MALFORMATION USING ULTRASONICS: ICD-10-CM

## 2024-08-26 DIAGNOSIS — O99.213 OBESITY AFFECTING PREGNANCY IN THIRD TRIMESTER, UNSPECIFIED OBESITY TYPE: ICD-10-CM

## 2024-08-26 DIAGNOSIS — Z3A.32 32 WEEKS GESTATION OF PREGNANCY: ICD-10-CM

## 2024-08-26 DIAGNOSIS — Z36.4 ULTRASOUND FOR ANTENATAL SCREENING FOR FETAL GROWTH RESTRICTION: ICD-10-CM

## 2024-08-26 DIAGNOSIS — O28.3 NUCHAL FOLD THICKENING ON PRENATAL ULTRASOUND: Primary | ICD-10-CM

## 2024-08-26 PROCEDURE — 76819 FETAL BIOPHYS PROFIL W/O NST: CPT | Performed by: OBSTETRICS & GYNECOLOGY

## 2024-08-26 PROCEDURE — 76805 OB US >/= 14 WKS SNGL FETUS: CPT | Performed by: OBSTETRICS & GYNECOLOGY

## 2024-08-26 PROCEDURE — 99999 PR OFFICE/OUTPT VISIT,PROCEDURE ONLY: CPT | Performed by: OBSTETRICS & GYNECOLOGY

## 2024-08-28 ENCOUNTER — ROUTINE PRENATAL (OUTPATIENT)
Dept: OBGYN CLINIC | Age: 29
End: 2024-08-28
Payer: COMMERCIAL

## 2024-08-28 ENCOUNTER — CARE COORDINATION (OUTPATIENT)
Dept: OTHER | Facility: CLINIC | Age: 29
End: 2024-08-28

## 2024-08-28 VITALS
SYSTOLIC BLOOD PRESSURE: 130 MMHG | DIASTOLIC BLOOD PRESSURE: 85 MMHG | HEART RATE: 110 BPM | BODY MASS INDEX: 39.14 KG/M2 | WEIGHT: 228 LBS

## 2024-08-28 DIAGNOSIS — Z23 NEED FOR TDAP VACCINATION: ICD-10-CM

## 2024-08-28 DIAGNOSIS — Z3A.33 33 WEEKS GESTATION OF PREGNANCY: ICD-10-CM

## 2024-08-28 DIAGNOSIS — N88.9 LESION OF CERVIX: ICD-10-CM

## 2024-08-28 DIAGNOSIS — O09.93 HIGH-RISK PREGNANCY IN THIRD TRIMESTER: Primary | ICD-10-CM

## 2024-08-28 PROBLEM — Z3A.32 32 WEEKS GESTATION OF PREGNANCY: Status: RESOLVED | Noted: 2024-08-20 | Resolved: 2024-08-28

## 2024-08-28 PROCEDURE — 0502F SUBSEQUENT PRENATAL CARE: CPT | Performed by: STUDENT IN AN ORGANIZED HEALTH CARE EDUCATION/TRAINING PROGRAM

## 2024-08-28 PROCEDURE — 90471 IMMUNIZATION ADMIN: CPT | Performed by: STUDENT IN AN ORGANIZED HEALTH CARE EDUCATION/TRAINING PROGRAM

## 2024-08-28 PROCEDURE — 90715 TDAP VACCINE 7 YRS/> IM: CPT | Performed by: STUDENT IN AN ORGANIZED HEALTH CARE EDUCATION/TRAINING PROGRAM

## 2024-08-28 NOTE — PROGRESS NOTES
Prenatal Visit    Jennifer Ramirez is a 28 y.o. female  at 33w1d IUP    The patient was seen and evaluated. She has no complaints today.  She reports positive fetal movements. She denies contractions, vaginal bleeding and leakage of fluid. Signs and symptoms of labor and pre-eclampsia were reviewed with the patient in detail. She is to report any of these if they occur. She currently denies any of these.    The problem list reflects the active issues addressed during today's visit    Vitals:     BP: 130/85  Weight - Scale: 103.4 kg (228 lb)  Pulse: (!) 110  Patient Position: Sitting  Fundal Height (cm): 33 cm  Fetal HR: 148  Movement: Present     PHYSICAL:   General appearance: no apparent distress, alert and cooperative  HEENT: head atraumatic, normocephalic, trachea midline, moist mucous membranes   Neurologic: alert, oriented, normal speech   Lungs: no increased work of breathing,   Abdomen: soft, gravid, non-tender on palpation    Musculoskeletal: no gross abnormalities, range of motion appropriate for age   Psychiatric: mood appropriate, normal affect      Assessment & Plan:  Jennifer Ramirez is a 28 y.o. female  at 33w1d   - VSS     - Reviewed previous prenatal labs    - Continue taking prenatal vitamins QD    - Tdap vaccination: given today     - Influenza vaccination: due this upcoming season     - Rhogam: is not indicated in this pregnancy    - COVID-19 vaccination: advise booster during pregnancy    -  testing indication: will start at 37 weeks due to pregravid BMI    - Next M appt on 24   - Discussed negative herpes swab and reviewed what an ectropion cervix is   Return in about 2 weeks (around 2024) for TALYA.    Counseling:   - Warning signs reviewed and recommendations when to call or present to the hospital if she experiences signs or symptoms of  labor and pre-eclampsia were reviewed.   - The patient was instructed on fetal kick counts. She was instructed that the baby

## 2024-08-28 NOTE — PROGRESS NOTES
After obtaining consent, and per orders of Dr. Ruggiero, injection of tdap given in Left deltoid by Alba Dudley MA. Patient instructed to remain in clinic for 20 minutes afterwards, and to report any adverse reaction to me immediately.

## 2024-08-28 NOTE — CARE COORDINATION
Patient eligible for [facility] Maternity Management Program    Maternity Care Manager contacted the patient by telephone to discuss the maternity management program.  Patient agrees to care management services at this time. Verified name and  with patient as identifiers.   Patient Current Location: [place]      Call within 2 business days of discharge: Yes     Last Discharge Facility       Date Complaint Diagnosis Description Type Department Provider    24 Rupture of Membranes  Admission (Discharged) [facility] Jazmyne Burnham MD            Risk Factors Identified:  Obesity      Needs to be reviewed by the provider   none         Method of communication with provider : none    Advance Care Planning:   Does patient have an Advance Directive:  reviewed and current.     Does patient have OB/Gyn Selected? Yes    Discussed follow up appointments. If no appointment was previously scheduled, appointment scheduling offered: Yes  [facility] follow up appointment(s):   Future Appointments   Date Time Provider Department Center   2024  8:30 AM Ana Virk DO [facility]   2024  8:45 AM Renetta Mortensen DO [facility]   2024  3:00 PM ULTRASONOGRAPHER 4 Mat Fetal [facility]   10/1/2024  3:15 PM Renetta Mortensen DO [facility]   10/1/2024  3:45 PM [facility]   10/7/2024  8:15 AM Jazmyne Burnham MD [facility]   10/7/2024  8:45 AM [facility]   10/14/2024  8:15 AM Nii Cunningham MD [facility]   10/14/2024  8:45 AM [facility]   10/21/2024 10:30 AM Renetta Mortensen DO [facility]   10/21/2024 11:00 AM [facility]   10/29/2024  1:00 PM Kristin Stewart APRN - CNP [facility]     Non-[facility] follow up appointment(s): n/a    OB History:   OB History    Para Term  AB Living   1    Patient will identify signs and symptoms requiring evaluation and intervention      Demonstrate how client is to evaluate contraction activity after discharge (e.g., lying down, tilted to the side with a pillow to the back, placing fingertips on the fundus for approximately 1 hour to note hardening or tightening of the uterus).  Identify signs and/or symptoms that should be reported immediately to the healthcare provider (e.g, sustained uterine contractions, clear drainage from vagina, bleeding).  Provide information about follow-up care when client is discharged.  Advise client to empty bladder every two (2) hours while awake  Review daily fluid need; avoid coffee.  Encourage regular rest periods 2-3 times a day in side-lying position. If bedrest is to be continued after discharge, suggest client spend part of day on couch or recliner. Assist patient to identify any risky behaviors and support change;     Red flags maternity  Call 911 anytime you think you may need emergency care. For example, call if:  Call your doctor now or seek immediate medical care if:     You passed out (lost consciousness).  You have a seizure.  You have severe vaginal bleeding.  You have severe pain in your belly or pelvis.  You have had fluid gushing or leaking from your vagina and you know or think the  umbilical cord is bulging into your vagina. If this happens, immediately get down on  your knees so your rear end (buttocks) is higher than your head. This will decrease  the pressure on the cord until help arrives.     You have signs of preeclampsia, such as:  Sudden swelling of your face, hands, or feet.  New vision problems (such as dimness, blurring, or seeing spots).  A severe headache.  You have any vaginal bleeding.  You have belly pain or cramping.  You have a fever.  You have had regular contractions (with or without pain) for an hour. This means that  you have 8 or more within 1 hour or 4 or more in 20 minutes after you change

## 2024-09-11 ENCOUNTER — ROUTINE PRENATAL (OUTPATIENT)
Dept: OBGYN CLINIC | Age: 29
End: 2024-09-11

## 2024-09-11 VITALS
SYSTOLIC BLOOD PRESSURE: 129 MMHG | WEIGHT: 228 LBS | BODY MASS INDEX: 39.14 KG/M2 | DIASTOLIC BLOOD PRESSURE: 87 MMHG | HEART RATE: 100 BPM

## 2024-09-11 DIAGNOSIS — O09.93 HIGH-RISK PREGNANCY IN THIRD TRIMESTER: Primary | ICD-10-CM

## 2024-09-11 DIAGNOSIS — Z3A.35 35 WEEKS GESTATION OF PREGNANCY: ICD-10-CM

## 2024-09-11 PROCEDURE — 0502F SUBSEQUENT PRENATAL CARE: CPT | Performed by: STUDENT IN AN ORGANIZED HEALTH CARE EDUCATION/TRAINING PROGRAM

## 2024-09-18 ENCOUNTER — HOSPITAL ENCOUNTER (OUTPATIENT)
Age: 29
Setting detail: SPECIMEN
Discharge: HOME | End: 2024-09-18

## 2024-09-18 ENCOUNTER — ROUTINE PRENATAL (OUTPATIENT)
Dept: OBGYN CLINIC | Age: 29
End: 2024-09-18

## 2024-09-18 VITALS
HEART RATE: 93 BPM | SYSTOLIC BLOOD PRESSURE: 124 MMHG | WEIGHT: 226 LBS | DIASTOLIC BLOOD PRESSURE: 82 MMHG | BODY MASS INDEX: 38.79 KG/M2

## 2024-09-18 DIAGNOSIS — O09.93 SUPERVISION OF HIGH RISK PREGNANCY IN THIRD TRIMESTER: ICD-10-CM

## 2024-09-18 DIAGNOSIS — Z3A.36 36 WEEKS GESTATION OF PREGNANCY: Primary | ICD-10-CM

## 2024-09-18 DIAGNOSIS — Z3A.36 36 WEEKS GESTATION OF PREGNANCY: ICD-10-CM

## 2024-09-18 PROCEDURE — 0502F SUBSEQUENT PRENATAL CARE: CPT | Performed by: OBSTETRICS & GYNECOLOGY

## 2024-09-20 PROBLEM — O99.820 GBS (GROUP B STREPTOCOCCUS CARRIER), +RV CULTURE, CURRENTLY PREGNANT: Status: ACTIVE | Noted: 2024-09-20

## 2024-09-20 LAB
MICROORGANISM SPEC CULT: ABNORMAL
SERVICE CMNT-IMP: ABNORMAL
SPECIMEN DESCRIPTION: ABNORMAL

## 2024-09-27 ENCOUNTER — ROUTINE PRENATAL (OUTPATIENT)
Dept: OBGYN CLINIC | Age: 29
End: 2024-09-27
Payer: COMMERCIAL

## 2024-09-27 VITALS
SYSTOLIC BLOOD PRESSURE: 120 MMHG | WEIGHT: 228 LBS | DIASTOLIC BLOOD PRESSURE: 85 MMHG | HEIGHT: 64 IN | HEART RATE: 116 BPM | BODY MASS INDEX: 38.93 KG/M2

## 2024-09-27 DIAGNOSIS — Z3A.37 37 WEEKS GESTATION OF PREGNANCY: ICD-10-CM

## 2024-09-27 DIAGNOSIS — O99.212 SEVERE OBESITY DUE TO EXCESS CALORIES AFFECTING PREGNANCY IN SECOND TRIMESTER: ICD-10-CM

## 2024-09-27 DIAGNOSIS — O99.820 GBS (GROUP B STREPTOCOCCUS CARRIER), +RV CULTURE, CURRENTLY PREGNANT: Primary | ICD-10-CM

## 2024-09-27 DIAGNOSIS — E66.01 SEVERE OBESITY DUE TO EXCESS CALORIES AFFECTING PREGNANCY IN SECOND TRIMESTER: ICD-10-CM

## 2024-09-27 DIAGNOSIS — O09.93 SUPERVISION OF HIGH RISK PREGNANCY IN THIRD TRIMESTER: ICD-10-CM

## 2024-09-27 DIAGNOSIS — O28.3 NUCHAL FOLD THICKENING ON PRENATAL ULTRASOUND: ICD-10-CM

## 2024-09-27 PROCEDURE — 59025 FETAL NON-STRESS TEST: CPT | Performed by: STUDENT IN AN ORGANIZED HEALTH CARE EDUCATION/TRAINING PROGRAM

## 2024-09-27 PROCEDURE — 0502F SUBSEQUENT PRENATAL CARE: CPT | Performed by: STUDENT IN AN ORGANIZED HEALTH CARE EDUCATION/TRAINING PROGRAM

## 2024-10-01 ENCOUNTER — ROUTINE PRENATAL (OUTPATIENT)
Dept: OBGYN CLINIC | Age: 29
End: 2024-10-01
Payer: COMMERCIAL

## 2024-10-01 ENCOUNTER — HOSPITAL ENCOUNTER (OUTPATIENT)
Age: 29
Discharge: HOME | End: 2024-10-01
Attending: STUDENT IN AN ORGANIZED HEALTH CARE EDUCATION/TRAINING PROGRAM | Admitting: STUDENT IN AN ORGANIZED HEALTH CARE EDUCATION/TRAINING PROGRAM
Payer: COMMERCIAL

## 2024-10-01 ENCOUNTER — HOSPITAL ENCOUNTER (OUTPATIENT)
Age: 29
Setting detail: SPECIMEN
Discharge: HOME | End: 2024-10-01
Payer: COMMERCIAL

## 2024-10-01 VITALS
DIASTOLIC BLOOD PRESSURE: 84 MMHG | SYSTOLIC BLOOD PRESSURE: 150 MMHG | BODY MASS INDEX: 39.27 KG/M2 | WEIGHT: 230 LBS | HEART RATE: 109 BPM | HEIGHT: 64 IN

## 2024-10-01 VITALS
TEMPERATURE: 98.1 F | DIASTOLIC BLOOD PRESSURE: 82 MMHG | OXYGEN SATURATION: 97 % | SYSTOLIC BLOOD PRESSURE: 133 MMHG | RESPIRATION RATE: 18 BRPM | HEART RATE: 95 BPM

## 2024-10-01 DIAGNOSIS — E66.01 SEVERE OBESITY DUE TO EXCESS CALORIES AFFECTING PREGNANCY IN SECOND TRIMESTER: ICD-10-CM

## 2024-10-01 DIAGNOSIS — R03.0 ELEVATED BP WITHOUT DIAGNOSIS OF HYPERTENSION: ICD-10-CM

## 2024-10-01 DIAGNOSIS — O28.3 NUCHAL FOLD THICKENING ON PRENATAL ULTRASOUND: Primary | ICD-10-CM

## 2024-10-01 DIAGNOSIS — O99.820 GBS (GROUP B STREPTOCOCCUS CARRIER), +RV CULTURE, CURRENTLY PREGNANT: ICD-10-CM

## 2024-10-01 DIAGNOSIS — O99.212 SEVERE OBESITY DUE TO EXCESS CALORIES AFFECTING PREGNANCY IN SECOND TRIMESTER: ICD-10-CM

## 2024-10-01 DIAGNOSIS — O09.93 SUPERVISION OF HIGH RISK PREGNANCY IN THIRD TRIMESTER: ICD-10-CM

## 2024-10-01 DIAGNOSIS — Z3A.38 38 WEEKS GESTATION OF PREGNANCY: ICD-10-CM

## 2024-10-01 LAB
ALBUMIN SERPL-MCNC: 3.4 G/DL (ref 3.5–5.2)
ALBUMIN/GLOB SERPL: 1 {RATIO} (ref 1–2.5)
ALP SERPL-CCNC: 147 U/L (ref 35–104)
ALT SERPL-CCNC: 11 U/L (ref 10–35)
ANION GAP SERPL CALCULATED.3IONS-SCNC: 11 MMOL/L (ref 9–16)
AST SERPL-CCNC: 18 U/L (ref 10–35)
BASOPHILS # BLD: <0.03 K/UL (ref 0–0.2)
BASOPHILS NFR BLD: 0 % (ref 0–2)
BILIRUB SERPL-MCNC: 0.2 MG/DL (ref 0–1.2)
BUN SERPL-MCNC: 11 MG/DL (ref 6–20)
CALCIUM SERPL-MCNC: 9 MG/DL (ref 8.6–10.4)
CHLORIDE SERPL-SCNC: 105 MMOL/L (ref 98–107)
CO2 SERPL-SCNC: 19 MMOL/L (ref 20–31)
CREAT SERPL-MCNC: 0.6 MG/DL (ref 0.5–0.9)
CREAT UR-MCNC: 150 MG/DL (ref 28–217)
CREAT UR-MCNC: 199 MG/DL (ref 28–217)
EOSINOPHIL # BLD: 0.04 K/UL (ref 0–0.44)
EOSINOPHILS RELATIVE PERCENT: 1 % (ref 1–4)
ERYTHROCYTE [DISTWIDTH] IN BLOOD BY AUTOMATED COUNT: 14.6 % (ref 11.8–14.4)
GFR, ESTIMATED: >90 ML/MIN/1.73M2
GLUCOSE SERPL-MCNC: 83 MG/DL (ref 74–99)
HCT VFR BLD AUTO: 35 % (ref 36.3–47.1)
HGB BLD-MCNC: 11.5 G/DL (ref 11.9–15.1)
IMM GRANULOCYTES # BLD AUTO: 0.03 K/UL (ref 0–0.3)
IMM GRANULOCYTES NFR BLD: 0 %
LYMPHOCYTES NFR BLD: 1.85 K/UL (ref 1.1–3.7)
LYMPHOCYTES RELATIVE PERCENT: 23 % (ref 24–43)
MCH RBC QN AUTO: 26.2 PG (ref 25.2–33.5)
MCHC RBC AUTO-ENTMCNC: 32.9 G/DL (ref 28.4–34.8)
MCV RBC AUTO: 79.7 FL (ref 82.6–102.9)
MONOCYTES NFR BLD: 0.47 K/UL (ref 0.1–1.2)
MONOCYTES NFR BLD: 6 % (ref 3–12)
NEUTROPHILS NFR BLD: 70 % (ref 36–65)
NEUTS SEG NFR BLD: 5.76 K/UL (ref 1.5–8.1)
NRBC BLD-RTO: 0 PER 100 WBC
PLATELET # BLD AUTO: 281 K/UL (ref 138–453)
PMV BLD AUTO: 11.3 FL (ref 8.1–13.5)
POTASSIUM SERPL-SCNC: 4 MMOL/L (ref 3.7–5.3)
PROT SERPL-MCNC: 6.4 G/DL (ref 6.6–8.7)
RBC # BLD AUTO: 4.39 M/UL (ref 3.95–5.11)
RBC # BLD: ABNORMAL 10*6/UL
SODIUM SERPL-SCNC: 135 MMOL/L (ref 136–145)
TOTAL PROTEIN, URINE: 23 MG/DL
TOTAL PROTEIN, URINE: 26 MG/DL
URINE TOTAL PROTEIN CREATININE RATIO: 0.13
URINE TOTAL PROTEIN CREATININE RATIO: 0.15
WBC OTHER # BLD: 8.2 K/UL (ref 3.5–11.3)

## 2024-10-01 PROCEDURE — 99213 OFFICE O/P EST LOW 20 MIN: CPT

## 2024-10-01 PROCEDURE — 85025 COMPLETE CBC W/AUTO DIFF WBC: CPT

## 2024-10-01 PROCEDURE — 84156 ASSAY OF PROTEIN URINE: CPT

## 2024-10-01 PROCEDURE — 0502F SUBSEQUENT PRENATAL CARE: CPT | Performed by: STUDENT IN AN ORGANIZED HEALTH CARE EDUCATION/TRAINING PROGRAM

## 2024-10-01 PROCEDURE — 82570 ASSAY OF URINE CREATININE: CPT

## 2024-10-01 PROCEDURE — 59025 FETAL NON-STRESS TEST: CPT | Performed by: STUDENT IN AN ORGANIZED HEALTH CARE EDUCATION/TRAINING PROGRAM

## 2024-10-01 PROCEDURE — 80053 COMPREHEN METABOLIC PANEL: CPT

## 2024-10-01 RX ORDER — ONDANSETRON 2 MG/ML
4 INJECTION INTRAMUSCULAR; INTRAVENOUS EVERY 6 HOURS PRN
Status: DISCONTINUED | OUTPATIENT
Start: 2024-10-01 | End: 2024-10-01 | Stop reason: HOSPADM

## 2024-10-01 RX ORDER — ACETAMINOPHEN 500 MG
1000 TABLET ORAL EVERY 6 HOURS PRN
Status: DISCONTINUED | OUTPATIENT
Start: 2024-10-01 | End: 2024-10-01 | Stop reason: HOSPADM

## 2024-10-01 RX ORDER — ONDANSETRON 4 MG/1
4 TABLET, ORALLY DISINTEGRATING ORAL EVERY 8 HOURS PRN
Status: DISCONTINUED | OUTPATIENT
Start: 2024-10-01 | End: 2024-10-01 | Stop reason: HOSPADM

## 2024-10-01 NOTE — PROGRESS NOTES
Prenatal Visit    Jennifer Ramirez is a 29 y.o. female  at 38w0d    The patient was seen and evaluated. Reports positive fetal movements. She denies headache, vision changes, RUQ pain, contractions, vaginal bleeding and leakage of fluid.      The patient already received the T-Dap Vaccine (27-36 weeks) this pregnancy.     The problem list reflects the active issues addressed during today's visit.    Allergies:  Patient has no known allergies.    Vitals:    BP: (!) 150/84  Weight - Scale: 104.3 kg (230 lb)  Pulse: (!) 109  Fetal HR: RNST  Movement: Present     Vitals:    10/01/24 1523 10/01/24 1557   BP: (!) 148/90 (!) 150/84   Site: Right Upper Arm    Position: Sitting    Cuff Size: Medium Adult    Pulse: (!) 109    Weight: 104.3 kg (230 lb)    Height: 1.626 m (5' 4.02\")        Physical Exam:  SVE: N/A    Labs:  Group Beta Strep collection was done.   Sensitivities for clindamycin and erythromycin were not ordered.  The patient was found to be GBS: positive    Assessment & Plan:  Jennifer Ramirez is a 29 y.o. female  at 38w0d   - Discussed signs or symptoms of when to call office   - Discussed fetal movement parameters  - Continue  testing until delivery for pre-pregnancy BMI.   - Undecided on RR IOL   - Continue taking Prenatal Vitamin.   - The ACIP recommended pregnant patients be included in phase 1C of vaccine distribution. This decision is supported by [facility] and ACOG. As of 2021, there have been over 30,000 pregnant patients included in the V-safe post COVID vaccination safety . Most (73%) reports to VAERS among pregnant women involved non-pregnancyspecific adverse events (e.g., local and systemic reactions). Miscarriage was the most frequently reported pregnancy-specific adverse event to VAERS; numbers are within the known background rates based on presumed COVID-19 vaccine doses administered to pregnant women. No unexpected pregnancy or infant outcomes have been observed

## 2024-10-01 NOTE — DISCHARGE INSTRUCTIONS
Diet:   Regular                         Increase Fluids  8-10 glasses/day    Activities:    As tolerated          Frequent rest periods    Resume previous activity           Shower                             Tub bath                 Additional Instructions:  Notify Physician    If any of the following                                   **Spots before eyes or blurred vision                      ** Intermittent low backache  **Dizziness or severe Headache                         **Chills & or fever                                                      **Decrease or absence of baby movement  **Vaginal pressure                                                  **Epigastric pain                                                                                                                     **  Right sided abdominal pain  **Uterine contractions are regular & 5 min. Apart           **Bag or barber leaking or gush of fluid from vagina     **Abdominal or menstrual like cramping that is constant or comes and goes  **Any vaginal bleeding that is heavier than a menstrual period  **Swelling of face, hands, legs or feet not decreased with rest on left side

## 2024-10-01 NOTE — H&P
OBSTETRICAL HISTORY AND PHYSICAL  [facility]    Date: 10/1/2024       Time: 5:58 PM   Patient Name: Jennifer Ramirez     Patient : 1995  Room/Bed: [facility]-    Admission Date/Time: 10/1/2024  5:02 PM      CC: Elevated blood pressures     HPI: Jennifer Ramirez is a 29 y.o.  at 38w0d who presents from her prenatal appointment for elevated blood pressures in clinic that ranges from 150-176/. The patient reports fetal movement is present, reports [place] contractions, denies loss of fluid, denies vaginal bleeding. Patient denies headache, vision changes, nausea, vomiting, fever, chills, shortness of breath, chest pain, RUQ pain, abdominal pain, diarrhea, change in color/amount/odor of vaginal discharge, dysuria or, hematuria.       DATING:  LMP: Patient's last menstrual period was 2024 (exact date).  Estimated Date of Delivery: 10/15/24   Based on: LMP 24, at 9 5/7 weeks GA    PREGNANCY RISK FACTORS:  Patient Active Problem List   Diagnosis    BMI 37    Nuchal fold thickening on prenatal ultrasound    Lesion of cervix    GBS (group B Streptococcus carrier), +RV culture, currently pregnant    Elevated BP without diagnosis of hypertension    38 weeks gestation of pregnancy        Steroids Given In This Pregnancy:  no     REVIEW OF SYSTEMS:   Constitutional: negative fever, negative chills, negative weight changes   HEENT: negative visual disturbances, negative headaches, negative dizziness, negative hearing loss  Breast: Negative breast abnormalities, negative breast lumps, negative nipple discharge  Respiratory: negative dyspnea, negative cough, negative SOB  Cardiovascular: negative chest pain,  negative palpitations, negative arrhythmia, negative syncope   Gastrointestinal: negative abdominal pain, negative RUQ pain, negative N/V, negative diarrhea, negative constipation, negative bowel changes, negative heartburn   Genitourinary: negative dysuria, 
urinary incontinence, negative vaginal discharge, negative vaginal bleeding or spotting  Dermatological: negative rash, negative pruritis, negative mole or other skin changes  Hematologic: negative bruising  Immunologic/Lymphatic: negative recent illness, negative recent sick contact  Musculoskeletal: negative back pain, negative myalgias, negative arthralgias  Neurological:  negative dizziness, negative migraines, negative seizures, negative weakness  Behavior/Psych: negative depression, negative anxiety, negative SI, negative HI      OBSTETRIC HISTORY:   OB History    Para Term  AB Living   1 0 0 0 0 0   SAB IAB Ectopic Molar Multiple Live Births   0 0 0 0 0 0      # Outcome Date GA Lbr Gio/2nd Weight Sex Type Anes PTL Lv   1 Current                PAST MEDICAL HISTORY:   has a past medical history of Gonorrhea and Stress incontinence.    PAST SURGICAL HISTORY:   has a past surgical history that includes [place] tooth extraction.    ALLERGIES:  has No Known Allergies.    MEDICATIONS:  Prior to Admission medications    Medication Sig Start Date End Date Taking? Authorizing Provider   Misc. Devices KIT Nature's Blend Prenatal Multivitamin with Minerals ([facility] Baby Box)  NDC: [address]-47;  Take 1 softgel by mouth daily or as instructed by provider;  #qs for 6 months 24   Micth Rosado MD   aspirin 81 MG chewable tablet Take 1 tablet by mouth daily    William Reagan MD   calcium carbonate (TUMS) 500 MG chewable tablet Take 1 tablet by mouth daily    William Reagan MD   Prenatal Vit-Fe Fumarate-FA (PRENATAL PO) Take by mouth  Patient not taking: Reported on 2024    William Reagan MD       FAMILY HISTORY:  family history includes Heart Attack in her maternal grandfather; Mult Sclerosis in her paternal aunt; No Known Problems in her maternal grandmother, mother, paternal grandfather, and paternal grandmother; Stroke (age of onset: 72) in her father.    SOCIAL HISTORY:

## 2024-10-02 ENCOUNTER — CARE COORDINATION (OUTPATIENT)
Dept: OTHER | Facility: CLINIC | Age: 29
End: 2024-10-02

## 2024-10-02 NOTE — CARE COORDINATION
Ambulatory Care Coordination Note    ACM attempted to reach patient for  Associate Care Management related to MCM/ED follow-up. HIPAA compliant message left requesting a return phone call at patient convenience.     Plan for follow-up call in 5-7 days      Future Appointments   Date Time Provider Department Center   10/7/2024  8:15 AM Jazmyne Burnham MD [facility]   10/7/2024  8:45 AM [facility]   10/14/2024  8:15 AM Renetta Mortensen DO [facility]   10/14/2024  8:45 AM [facility]   10/21/2024 10:30 AM Renetta Mortensen DO [facility]   10/21/2024 11:00 AM [facility]   10/29/2024  1:00 PM Kristin Stewart, APRN - CNP [facility]       ROSALINA Rice, RN  Associate Care Manager   Cell: [phone]  [email or web address]

## 2024-10-04 ENCOUNTER — ROUTINE PRENATAL (OUTPATIENT)
Dept: OBGYN CLINIC | Age: 29
End: 2024-10-04

## 2024-10-04 VITALS
HEART RATE: 89 BPM | DIASTOLIC BLOOD PRESSURE: 88 MMHG | SYSTOLIC BLOOD PRESSURE: 125 MMHG | WEIGHT: 227 LBS | BODY MASS INDEX: 38.94 KG/M2

## 2024-10-04 DIAGNOSIS — Z3A.38 38 WEEKS GESTATION OF PREGNANCY: Primary | ICD-10-CM

## 2024-10-04 DIAGNOSIS — R03.0 ELEVATED BP WITHOUT DIAGNOSIS OF HYPERTENSION: ICD-10-CM

## 2024-10-04 NOTE — PROGRESS NOTES
Jennifer is here for a BP check at 38w3d.  She had elevated BPs in the office the other day and was sent to the hospital where her BPs were normal. Her BP today was 125/88.  She is asymptomatic.  Her PreE labs on 10/1 were all normal and so were her labs so she didn't meet criteria for GHTN or PreE.  She has a NST/BPP visit in 3 days that she will follow up for.    Jazmyne Burnham MD

## 2024-10-05 NOTE — PROGRESS NOTES
Jennifer is a  @ 38w6d who presents for TALYA visit.  She denies LOF, VB.  + FM.  She is having occ ctxs, but nothing regular or painful at this time.  Pt uncertain about induction vs waiting for spontaneous labor.  She denies any fevers/chills, SOB, cough, sore throat, loss of taste/smell or sick contacts.  Pt denies any HA, vision changes or RUQ pain.     O:  Vitals:    10/07/24 0851   BP: 120/87   Pulse: (!) 101     Gen: NAD  Abd: soft, nontender, gravid  Ext:  mild edema    NST: 145, mod variability, accels, no decels.  Category 1 FHTs, reactive.    BP: 120/87  Weight - Scale: 101.6 kg (224 lb)  Pulse: (!) 101  Patient Position: Sitting  Fetal HR: rnst  Movement: Present  Presentation: Vertex    A/P:  Patient Active Problem List    Diagnosis Date Noted    Elevated BP without diagnosis of hypertension 10/01/2024     148/90 and 150/84 on 10/1/24. Sent to L&D for Pre E work up.      38 weeks gestation of pregnancy 10/01/2024    GBS (group B Streptococcus carrier), +RV culture, currently pregnant 2024     PCN G in labor      Lesion of cervix 2024:  Small friable cystic lesions noted at 8 o'clock and 12 o'clock most consistent with cervical ectropion but somewhat vesicular like HSV  - No hx of HSV  - Denies s/sx   - Low clinical concern, but swabs obtained out of an abundance of caution  Herpes swab negative        BMI 37 2024     ASA 81 mg. NSTs at 37 weeks      Nuchal fold thickening on prenatal ultrasound 2024     NIPT Low Risk       - Discussed updated COVID precautions and policies. Reviewed updated visitor policy. Encouraged social distancing and appropriate hand washing/hygiene practices. Reviewed symptoms suspicious for COVID infection. Discussed that ACOG, SMFM, and the CDC recommend to not withold immunization in pregnant and breastfeeding women who meet criteria for receipt of the vaccine based on the ACIP recommended priority groups. All questions answered. Patient

## 2024-10-07 ENCOUNTER — ROUTINE PRENATAL (OUTPATIENT)
Dept: OBGYN CLINIC | Age: 29
End: 2024-10-07

## 2024-10-07 VITALS
WEIGHT: 224 LBS | BODY MASS INDEX: 38.43 KG/M2 | HEART RATE: 101 BPM | DIASTOLIC BLOOD PRESSURE: 87 MMHG | SYSTOLIC BLOOD PRESSURE: 120 MMHG

## 2024-10-07 DIAGNOSIS — Z3A.38 38 WEEKS GESTATION OF PREGNANCY: Primary | ICD-10-CM

## 2024-10-07 DIAGNOSIS — O09.93 SUPERVISION OF HIGH RISK PREGNANCY IN THIRD TRIMESTER: ICD-10-CM

## 2024-10-07 PROCEDURE — 0502F SUBSEQUENT PRENATAL CARE: CPT | Performed by: OBSTETRICS & GYNECOLOGY

## 2024-10-08 ENCOUNTER — HOSPITAL ENCOUNTER (INPATIENT)
Age: 29
LOS: 3 days | Discharge: HOME | End: 2024-10-11
Attending: OBSTETRICS & GYNECOLOGY | Admitting: STUDENT IN AN ORGANIZED HEALTH CARE EDUCATION/TRAINING PROGRAM
Payer: COMMERCIAL

## 2024-10-08 PROBLEM — Z3A.38 38 WEEKS GESTATION OF PREGNANCY: Status: RESOLVED | Noted: 2024-10-01 | Resolved: 2024-10-08

## 2024-10-08 PROBLEM — O99.212 SEVERE OBESITY DUE TO EXCESS CALORIES AFFECTING PREGNANCY IN SECOND TRIMESTER: Status: RESOLVED | Noted: 2024-06-18 | Resolved: 2024-10-08

## 2024-10-08 PROBLEM — Z3A.39 39 WEEKS GESTATION OF PREGNANCY: Status: ACTIVE | Noted: 2024-10-08

## 2024-10-08 PROBLEM — E66.01 SEVERE OBESITY DUE TO EXCESS CALORIES AFFECTING PREGNANCY IN SECOND TRIMESTER: Status: RESOLVED | Noted: 2024-06-18 | Resolved: 2024-10-08

## 2024-10-08 PROCEDURE — 1220000000 HC SEMI PRIVATE OB R&B

## 2024-10-08 RX ORDER — SODIUM CHLORIDE 0.9 % (FLUSH) 0.9 %
5-40 SYRINGE (ML) INJECTION PRN
Status: DISCONTINUED | OUTPATIENT
Start: 2024-10-08 | End: 2024-10-09

## 2024-10-08 RX ORDER — SODIUM CHLORIDE 0.9 % (FLUSH) 0.9 %
5-40 SYRINGE (ML) INJECTION EVERY 12 HOURS SCHEDULED
Status: DISCONTINUED | OUTPATIENT
Start: 2024-10-09 | End: 2024-10-09

## 2024-10-08 RX ORDER — ONDANSETRON 2 MG/ML
4 INJECTION INTRAMUSCULAR; INTRAVENOUS EVERY 6 HOURS PRN
Status: DISCONTINUED | OUTPATIENT
Start: 2024-10-08 | End: 2024-10-08

## 2024-10-08 RX ORDER — NALBUPHINE HYDROCHLORIDE 10 MG/ML
10 INJECTION, SOLUTION INTRAMUSCULAR; INTRAVENOUS; SUBCUTANEOUS ONCE
Status: COMPLETED | OUTPATIENT
Start: 2024-10-09 | End: 2024-10-09

## 2024-10-08 RX ORDER — SODIUM CHLORIDE, SODIUM LACTATE, POTASSIUM CHLORIDE, CALCIUM CHLORIDE 600; 310; 30; 20 MG/100ML; MG/100ML; MG/100ML; MG/100ML
INJECTION, SOLUTION INTRAVENOUS CONTINUOUS
Status: DISCONTINUED | OUTPATIENT
Start: 2024-10-09 | End: 2024-10-09

## 2024-10-08 RX ORDER — SODIUM CHLORIDE, SODIUM LACTATE, POTASSIUM CHLORIDE, AND CALCIUM CHLORIDE .6; .31; .03; .02 G/100ML; G/100ML; G/100ML; G/100ML
500 INJECTION, SOLUTION INTRAVENOUS PRN
Status: DISCONTINUED | OUTPATIENT
Start: 2024-10-08 | End: 2024-10-09

## 2024-10-08 RX ORDER — SODIUM CHLORIDE 9 MG/ML
INJECTION, SOLUTION INTRAVENOUS PRN
Status: DISCONTINUED | OUTPATIENT
Start: 2024-10-08 | End: 2024-10-09

## 2024-10-08 RX ORDER — ACETAMINOPHEN 500 MG
1000 TABLET ORAL EVERY 6 HOURS PRN
Status: DISCONTINUED | OUTPATIENT
Start: 2024-10-08 | End: 2024-10-09

## 2024-10-08 RX ORDER — ONDANSETRON 4 MG/1
4 TABLET, ORALLY DISINTEGRATING ORAL EVERY 8 HOURS PRN
Status: DISCONTINUED | OUTPATIENT
Start: 2024-10-08 | End: 2024-10-08

## 2024-10-08 RX ORDER — ACETAMINOPHEN 500 MG
1000 TABLET ORAL EVERY 8 HOURS SCHEDULED
Status: DISCONTINUED | OUTPATIENT
Start: 2024-10-08 | End: 2024-10-08

## 2024-10-08 RX ORDER — SODIUM CHLORIDE, SODIUM LACTATE, POTASSIUM CHLORIDE, AND CALCIUM CHLORIDE .6; .31; .03; .02 G/100ML; G/100ML; G/100ML; G/100ML
1000 INJECTION, SOLUTION INTRAVENOUS PRN
Status: DISCONTINUED | OUTPATIENT
Start: 2024-10-08 | End: 2024-10-09

## 2024-10-09 ENCOUNTER — ANESTHESIA EVENT (OUTPATIENT)
Dept: LABOR AND DELIVERY | Age: 29
End: 2024-10-09
Payer: COMMERCIAL

## 2024-10-09 ENCOUNTER — ANESTHESIA (OUTPATIENT)
Dept: LABOR AND DELIVERY | Age: 29
End: 2024-10-09
Payer: COMMERCIAL

## 2024-10-09 PROBLEM — Z98.891 S/P CESAREAN SECTION: Status: ACTIVE | Noted: 2024-10-09

## 2024-10-09 LAB
AMPHET UR QL SCN: NEGATIVE
BARBITURATES UR QL SCN: NEGATIVE
BASOPHILS # BLD: <0.03 K/UL (ref 0–0.2)
BASOPHILS NFR BLD: 0 % (ref 0–2)
BENZODIAZ UR QL: NEGATIVE
CANNABINOIDS UR QL SCN: NEGATIVE
COCAINE UR QL SCN: NEGATIVE
EOSINOPHIL # BLD: 0.07 K/UL (ref 0–0.44)
EOSINOPHILS RELATIVE PERCENT: 1 % (ref 1–4)
ERYTHROCYTE [DISTWIDTH] IN BLOOD BY AUTOMATED COUNT: 15.1 % (ref 11.8–14.4)
ERYTHROCYTE [DISTWIDTH] IN BLOOD BY AUTOMATED COUNT: 15.2 % (ref 11.8–14.4)
FENTANYL UR QL: NEGATIVE
FIBRINOGEN PPP-MCNC: 454 MG/DL (ref 203–521)
HCT VFR BLD AUTO: 36.1 % (ref 36.3–47.1)
HCT VFR BLD AUTO: 36.8 % (ref 36.3–47.1)
HGB BLD-MCNC: 11.3 G/DL (ref 11.9–15.1)
HGB BLD-MCNC: 11.4 G/DL (ref 11.9–15.1)
IMM GRANULOCYTES # BLD AUTO: 0.05 K/UL (ref 0–0.3)
IMM GRANULOCYTES NFR BLD: 1 %
INR PPP: 1
LYMPHOCYTES NFR BLD: 2.23 K/UL (ref 1.1–3.7)
LYMPHOCYTES RELATIVE PERCENT: 22 % (ref 24–43)
MCH RBC QN AUTO: 25.6 PG (ref 25.2–33.5)
MCH RBC QN AUTO: 26 PG (ref 25.2–33.5)
MCHC RBC AUTO-ENTMCNC: 30.7 G/DL (ref 28.4–34.8)
MCHC RBC AUTO-ENTMCNC: 31.6 G/DL (ref 28.4–34.8)
MCV RBC AUTO: 80.9 FL (ref 82.6–102.9)
MCV RBC AUTO: 84.6 FL (ref 82.6–102.9)
METHADONE UR QL: NEGATIVE
MONOCYTES NFR BLD: 0.81 K/UL (ref 0.1–1.2)
MONOCYTES NFR BLD: 8 % (ref 3–12)
NEUTROPHILS NFR BLD: 68 % (ref 36–65)
NEUTS SEG NFR BLD: 7.01 K/UL (ref 1.5–8.1)
NRBC BLD-RTO: 0 PER 100 WBC
NRBC BLD-RTO: 0 PER 100 WBC
OPIATES UR QL SCN: NEGATIVE
OXYCODONE UR QL SCN: NEGATIVE
PARTIAL THROMBOPLASTIN TIME: 28 SEC (ref 23–36.5)
PCP UR QL SCN: NEGATIVE
PLATELET # BLD AUTO: 248 K/UL (ref 138–453)
PLATELET # BLD AUTO: 257 K/UL (ref 138–453)
PMV BLD AUTO: 11.4 FL (ref 8.1–13.5)
PMV BLD AUTO: 11.9 FL (ref 8.1–13.5)
PROTHROMBIN TIME: 13.3 SEC (ref 11.7–14.9)
RBC # BLD AUTO: 4.35 M/UL (ref 3.95–5.11)
RBC # BLD AUTO: 4.46 M/UL (ref 3.95–5.11)
RBC # BLD: ABNORMAL 10*6/UL
T PALLIDUM AB SER QL IA: NONREACTIVE
TEST INFORMATION: NORMAL
WBC OTHER # BLD: 10.2 K/UL (ref 3.5–11.3)
WBC OTHER # BLD: 17.9 K/UL (ref 3.5–11.3)

## 2024-10-09 PROCEDURE — 10907ZC DRAINAGE OF AMNIOTIC FLUID, THERAPEUTIC FROM PRODUCTS OF CONCEPTION, VIA NATURAL OR ARTIFICIAL OPENING: ICD-10-PCS | Performed by: STUDENT IN AN ORGANIZED HEALTH CARE EDUCATION/TRAINING PROGRAM

## 2024-10-09 PROCEDURE — 6360000002 HC RX W HCPCS: Performed by: STUDENT IN AN ORGANIZED HEALTH CARE EDUCATION/TRAINING PROGRAM

## 2024-10-09 PROCEDURE — 6360000002 HC RX W HCPCS

## 2024-10-09 PROCEDURE — 2580000003 HC RX 258: Performed by: STUDENT IN AN ORGANIZED HEALTH CARE EDUCATION/TRAINING PROGRAM

## 2024-10-09 PROCEDURE — 3609079900 HC CESAREAN SECTION: Performed by: STUDENT IN AN ORGANIZED HEALTH CARE EDUCATION/TRAINING PROGRAM

## 2024-10-09 PROCEDURE — 85025 COMPLETE CBC W/AUTO DIFF WBC: CPT

## 2024-10-09 PROCEDURE — 3700000001 HC ADD 15 MINUTES (ANESTHESIA): Performed by: STUDENT IN AN ORGANIZED HEALTH CARE EDUCATION/TRAINING PROGRAM

## 2024-10-09 PROCEDURE — 80307 DRUG TEST PRSMV CHEM ANLYZR: CPT

## 2024-10-09 PROCEDURE — 59510 CESAREAN DELIVERY: CPT | Performed by: STUDENT IN AN ORGANIZED HEALTH CARE EDUCATION/TRAINING PROGRAM

## 2024-10-09 PROCEDURE — 86900 BLOOD TYPING SEROLOGIC ABO: CPT

## 2024-10-09 PROCEDURE — 2709999900 HC NON-CHARGEABLE SUPPLY: Performed by: STUDENT IN AN ORGANIZED HEALTH CARE EDUCATION/TRAINING PROGRAM

## 2024-10-09 PROCEDURE — 86923 COMPATIBILITY TEST ELECTRIC: CPT

## 2024-10-09 PROCEDURE — 86901 BLOOD TYPING SEROLOGIC RH(D): CPT

## 2024-10-09 PROCEDURE — 85384 FIBRINOGEN ACTIVITY: CPT

## 2024-10-09 PROCEDURE — 2580000003 HC RX 258

## 2024-10-09 PROCEDURE — 7100000000 HC PACU RECOVERY - FIRST 15 MIN: Performed by: STUDENT IN AN ORGANIZED HEALTH CARE EDUCATION/TRAINING PROGRAM

## 2024-10-09 PROCEDURE — 88307 TISSUE EXAM BY PATHOLOGIST: CPT

## 2024-10-09 PROCEDURE — 85610 PROTHROMBIN TIME: CPT

## 2024-10-09 PROCEDURE — 6370000000 HC RX 637 (ALT 250 FOR IP)

## 2024-10-09 PROCEDURE — 2720000010 HC SURG SUPPLY STERILE: Performed by: STUDENT IN AN ORGANIZED HEALTH CARE EDUCATION/TRAINING PROGRAM

## 2024-10-09 PROCEDURE — 36415 COLL VENOUS BLD VENIPUNCTURE: CPT

## 2024-10-09 PROCEDURE — 85730 THROMBOPLASTIN TIME PARTIAL: CPT

## 2024-10-09 PROCEDURE — 3700000000 HC ANESTHESIA ATTENDED CARE: Performed by: STUDENT IN AN ORGANIZED HEALTH CARE EDUCATION/TRAINING PROGRAM

## 2024-10-09 PROCEDURE — 2500000003 HC RX 250 WO HCPCS: Performed by: STUDENT IN AN ORGANIZED HEALTH CARE EDUCATION/TRAINING PROGRAM

## 2024-10-09 PROCEDURE — 86850 RBC ANTIBODY SCREEN: CPT

## 2024-10-09 PROCEDURE — 2500000003 HC RX 250 WO HCPCS

## 2024-10-09 PROCEDURE — 1220000000 HC SEMI PRIVATE OB R&B

## 2024-10-09 PROCEDURE — 2500000003 HC RX 250 WO HCPCS: Performed by: NURSE ANESTHETIST, CERTIFIED REGISTERED

## 2024-10-09 PROCEDURE — 0UQ90ZZ REPAIR UTERUS, OPEN APPROACH: ICD-10-PCS | Performed by: STUDENT IN AN ORGANIZED HEALTH CARE EDUCATION/TRAINING PROGRAM

## 2024-10-09 PROCEDURE — 3700000025 EPIDURAL BLOCK: Performed by: ANESTHESIOLOGY

## 2024-10-09 PROCEDURE — 85027 COMPLETE CBC AUTOMATED: CPT

## 2024-10-09 PROCEDURE — 6370000000 HC RX 637 (ALT 250 FOR IP): Performed by: STUDENT IN AN ORGANIZED HEALTH CARE EDUCATION/TRAINING PROGRAM

## 2024-10-09 PROCEDURE — 7100000001 HC PACU RECOVERY - ADDTL 15 MIN: Performed by: STUDENT IN AN ORGANIZED HEALTH CARE EDUCATION/TRAINING PROGRAM

## 2024-10-09 PROCEDURE — 86780 TREPONEMA PALLIDUM: CPT

## 2024-10-09 RX ORDER — AZITHROMYCIN 500 MG/1
INJECTION, POWDER, LYOPHILIZED, FOR SOLUTION INTRAVENOUS
Status: DISCONTINUED | OUTPATIENT
Start: 2024-10-09 | End: 2024-10-09 | Stop reason: SDUPTHER

## 2024-10-09 RX ORDER — TRANEXAMIC ACID 10 MG/ML
INJECTION, SOLUTION INTRAVENOUS
Status: DISCONTINUED | OUTPATIENT
Start: 2024-10-09 | End: 2024-10-09 | Stop reason: SDUPTHER

## 2024-10-09 RX ORDER — CEPHALEXIN 500 MG/1
500 CAPSULE ORAL EVERY 8 HOURS SCHEDULED
Status: COMPLETED | OUTPATIENT
Start: 2024-10-09 | End: 2024-10-11

## 2024-10-09 RX ORDER — ONDANSETRON 4 MG/1
4 TABLET, ORALLY DISINTEGRATING ORAL EVERY 8 HOURS PRN
Status: DISCONTINUED | OUTPATIENT
Start: 2024-10-09 | End: 2024-10-11 | Stop reason: HOSPADM

## 2024-10-09 RX ORDER — SODIUM CHLORIDE 0.9 % (FLUSH) 0.9 %
5-40 SYRINGE (ML) INJECTION PRN
Status: DISCONTINUED | OUTPATIENT
Start: 2024-10-09 | End: 2024-10-11 | Stop reason: HOSPADM

## 2024-10-09 RX ORDER — SENNA AND DOCUSATE SODIUM 50; 8.6 MG/1; MG/1
2 TABLET, FILM COATED ORAL 2 TIMES DAILY
Status: DISCONTINUED | OUTPATIENT
Start: 2024-10-09 | End: 2024-10-11 | Stop reason: HOSPADM

## 2024-10-09 RX ORDER — DEXAMETHASONE SODIUM PHOSPHATE 10 MG/ML
INJECTION, SOLUTION INTRAMUSCULAR; INTRAVENOUS
Status: DISCONTINUED | OUTPATIENT
Start: 2024-10-09 | End: 2024-10-09 | Stop reason: SDUPTHER

## 2024-10-09 RX ORDER — ONDANSETRON 2 MG/ML
INJECTION INTRAMUSCULAR; INTRAVENOUS
Status: DISCONTINUED | OUTPATIENT
Start: 2024-10-09 | End: 2024-10-09 | Stop reason: SDUPTHER

## 2024-10-09 RX ORDER — MORPHINE SULFATE 10 MG/ML
10 INJECTION INTRAVENOUS ONCE
Status: COMPLETED | OUTPATIENT
Start: 2024-10-09 | End: 2024-10-09

## 2024-10-09 RX ORDER — ONDANSETRON 2 MG/ML
4 INJECTION INTRAMUSCULAR; INTRAVENOUS EVERY 6 HOURS PRN
Status: DISCONTINUED | OUTPATIENT
Start: 2024-10-09 | End: 2024-10-09

## 2024-10-09 RX ORDER — METRONIDAZOLE 500 MG/1
500 TABLET ORAL EVERY 8 HOURS SCHEDULED
Status: COMPLETED | OUTPATIENT
Start: 2024-10-09 | End: 2024-10-11

## 2024-10-09 RX ORDER — EPHEDRINE SULFATE 50 MG/ML
10 INJECTION INTRAVENOUS EVERY 5 MIN PRN
Status: DISCONTINUED | OUTPATIENT
Start: 2024-10-09 | End: 2024-10-09

## 2024-10-09 RX ORDER — AMOXICILLIN 250 MG
1 CAPSULE ORAL DAILY
Qty: 30 TABLET | Refills: 0 | Status: SHIPPED | OUTPATIENT
Start: 2024-10-09

## 2024-10-09 RX ORDER — CARBOPROST TROMETHAMINE 250 UG/ML
250 INJECTION, SOLUTION INTRAMUSCULAR ONCE
Status: COMPLETED | OUTPATIENT
Start: 2024-10-09 | End: 2024-10-09

## 2024-10-09 RX ORDER — SCOLOPAMINE TRANSDERMAL SYSTEM 1 MG/1
1 PATCH, EXTENDED RELEASE TRANSDERMAL
Status: DISCONTINUED | OUTPATIENT
Start: 2024-10-09 | End: 2024-10-09

## 2024-10-09 RX ORDER — ENOXAPARIN SODIUM 100 MG/ML
40 INJECTION SUBCUTANEOUS DAILY
Status: DISCONTINUED | OUTPATIENT
Start: 2024-10-10 | End: 2024-10-11 | Stop reason: HOSPADM

## 2024-10-09 RX ORDER — MIDAZOLAM HYDROCHLORIDE 1 MG/ML
INJECTION INTRAMUSCULAR; INTRAVENOUS
Status: DISCONTINUED | OUTPATIENT
Start: 2024-10-09 | End: 2024-10-09 | Stop reason: SDUPTHER

## 2024-10-09 RX ORDER — MORPHINE SULFATE 1 MG/ML
INJECTION, SOLUTION EPIDURAL; INTRATHECAL; INTRAVENOUS
Status: DISCONTINUED | OUTPATIENT
Start: 2024-10-09 | End: 2024-10-09 | Stop reason: SDUPTHER

## 2024-10-09 RX ORDER — IBUPROFEN 800 MG/1
800 TABLET, FILM COATED ORAL EVERY 8 HOURS
Status: DISCONTINUED | OUTPATIENT
Start: 2024-10-10 | End: 2024-10-10

## 2024-10-09 RX ORDER — TRANEXAMIC ACID 10 MG/ML
1000 INJECTION, SOLUTION INTRAVENOUS ONCE
Status: DISCONTINUED | OUTPATIENT
Start: 2024-10-09 | End: 2024-10-09

## 2024-10-09 RX ORDER — NALOXONE HYDROCHLORIDE 0.4 MG/ML
INJECTION, SOLUTION INTRAMUSCULAR; INTRAVENOUS; SUBCUTANEOUS PRN
Status: DISCONTINUED | OUTPATIENT
Start: 2024-10-09 | End: 2024-10-09

## 2024-10-09 RX ORDER — ONDANSETRON 4 MG/1
4 TABLET, ORALLY DISINTEGRATING ORAL EVERY 6 HOURS PRN
Status: DISCONTINUED | OUTPATIENT
Start: 2024-10-09 | End: 2024-10-09

## 2024-10-09 RX ORDER — CALCIUM CARBONATE 500 MG/1
1000 TABLET, CHEWABLE ORAL 3 TIMES DAILY PRN
Status: DISCONTINUED | OUTPATIENT
Start: 2024-10-09 | End: 2024-10-09

## 2024-10-09 RX ORDER — IBUPROFEN 600 MG/1
600 TABLET, FILM COATED ORAL 4 TIMES DAILY PRN
Qty: 30 TABLET | Refills: 1 | Status: SHIPPED | OUTPATIENT
Start: 2024-10-09

## 2024-10-09 RX ORDER — ACETAMINOPHEN 500 MG
1000 TABLET ORAL EVERY 6 HOURS SCHEDULED
Status: DISCONTINUED | OUTPATIENT
Start: 2024-10-09 | End: 2024-10-11 | Stop reason: HOSPADM

## 2024-10-09 RX ORDER — SODIUM CHLORIDE 9 MG/ML
INJECTION, SOLUTION INTRAVENOUS PRN
Status: DISCONTINUED | OUTPATIENT
Start: 2024-10-09 | End: 2024-10-11 | Stop reason: HOSPADM

## 2024-10-09 RX ORDER — OXYCODONE HYDROCHLORIDE 5 MG/1
10 TABLET ORAL EVERY 4 HOURS PRN
Status: DISCONTINUED | OUTPATIENT
Start: 2024-10-09 | End: 2024-10-11 | Stop reason: HOSPADM

## 2024-10-09 RX ORDER — ACETAMINOPHEN 500 MG
1000 TABLET ORAL 4 TIMES DAILY PRN
Qty: 30 TABLET | Refills: 1 | Status: SHIPPED | OUTPATIENT
Start: 2024-10-09

## 2024-10-09 RX ORDER — LIDOCAINE HYDROCHLORIDE AND EPINEPHRINE 15; 5 MG/ML; UG/ML
INJECTION, SOLUTION EPIDURAL
Status: DISCONTINUED | OUTPATIENT
Start: 2024-10-09 | End: 2024-10-09 | Stop reason: SDUPTHER

## 2024-10-09 RX ORDER — OXYCODONE HYDROCHLORIDE 5 MG/1
5 TABLET ORAL EVERY 4 HOURS PRN
Status: DISCONTINUED | OUTPATIENT
Start: 2024-10-09 | End: 2024-10-11 | Stop reason: HOSPADM

## 2024-10-09 RX ORDER — CITRIC ACID/SODIUM CITRATE 334-500MG
30 SOLUTION, ORAL ORAL ONCE
Status: COMPLETED | OUTPATIENT
Start: 2024-10-09 | End: 2024-10-09

## 2024-10-09 RX ORDER — SODIUM CHLORIDE 0.9 % (FLUSH) 0.9 %
5-40 SYRINGE (ML) INJECTION EVERY 12 HOURS SCHEDULED
Status: DISCONTINUED | OUTPATIENT
Start: 2024-10-09 | End: 2024-10-11 | Stop reason: HOSPADM

## 2024-10-09 RX ORDER — KETOROLAC TROMETHAMINE 30 MG/ML
30 INJECTION, SOLUTION INTRAMUSCULAR; INTRAVENOUS EVERY 6 HOURS
Status: DISCONTINUED | OUTPATIENT
Start: 2024-10-09 | End: 2024-10-10

## 2024-10-09 RX ORDER — SODIUM CHLORIDE, SODIUM LACTATE, POTASSIUM CHLORIDE, CALCIUM CHLORIDE 600; 310; 30; 20 MG/100ML; MG/100ML; MG/100ML; MG/100ML
INJECTION, SOLUTION INTRAVENOUS
Status: DISCONTINUED | OUTPATIENT
Start: 2024-10-09 | End: 2024-10-09 | Stop reason: SDUPTHER

## 2024-10-09 RX ORDER — SIMETHICONE 80 MG
80 TABLET,CHEWABLE ORAL EVERY 6 HOURS PRN
Status: DISCONTINUED | OUTPATIENT
Start: 2024-10-09 | End: 2024-10-11 | Stop reason: HOSPADM

## 2024-10-09 RX ORDER — MORPHINE SULFATE 2 MG/ML
2 INJECTION, SOLUTION INTRAMUSCULAR; INTRAVENOUS ONCE
Status: COMPLETED | OUTPATIENT
Start: 2024-10-09 | End: 2024-10-09

## 2024-10-09 RX ORDER — ONDANSETRON 2 MG/ML
4 INJECTION INTRAMUSCULAR; INTRAVENOUS EVERY 6 HOURS PRN
Status: DISCONTINUED | OUTPATIENT
Start: 2024-10-09 | End: 2024-10-11 | Stop reason: HOSPADM

## 2024-10-09 RX ORDER — OXYCODONE HYDROCHLORIDE 5 MG/1
5 TABLET ORAL EVERY 6 HOURS PRN
Qty: 20 TABLET | Refills: 0 | Status: SHIPPED | OUTPATIENT
Start: 2024-10-09 | End: 2024-10-14

## 2024-10-09 RX ORDER — ACETAMINOPHEN 500 MG
1000 TABLET ORAL ONCE
Status: COMPLETED | OUTPATIENT
Start: 2024-10-09 | End: 2024-10-09

## 2024-10-09 RX ORDER — METHYLERGONOVINE MALEATE 0.2 MG/ML
INJECTION INTRAVENOUS
Status: DISCONTINUED | OUTPATIENT
Start: 2024-10-09 | End: 2024-10-09 | Stop reason: SDUPTHER

## 2024-10-09 RX ORDER — BISACODYL 10 MG
10 SUPPOSITORY, RECTAL RECTAL DAILY PRN
Status: DISCONTINUED | OUTPATIENT
Start: 2024-10-09 | End: 2024-10-11 | Stop reason: HOSPADM

## 2024-10-09 RX ORDER — BUPRENORPHINE AND NALOXONE 4; 1 MG/1; MG/1
2 FILM, SOLUBLE BUCCAL; SUBLINGUAL 2 TIMES DAILY
Status: DISCONTINUED | OUTPATIENT
Start: 2024-10-09 | End: 2024-10-09

## 2024-10-09 RX ORDER — LIDOCAINE HYDROCHLORIDE 20 MG/ML
INJECTION, SOLUTION EPIDURAL; INFILTRATION; INTRACAUDAL; PERINEURAL
Status: DISCONTINUED | OUTPATIENT
Start: 2024-10-09 | End: 2024-10-09 | Stop reason: SDUPTHER

## 2024-10-09 RX ORDER — SODIUM CHLORIDE, SODIUM LACTATE, POTASSIUM CHLORIDE, CALCIUM CHLORIDE 600; 310; 30; 20 MG/100ML; MG/100ML; MG/100ML; MG/100ML
INJECTION, SOLUTION INTRAVENOUS CONTINUOUS
Status: DISCONTINUED | OUTPATIENT
Start: 2024-10-09 | End: 2024-10-10

## 2024-10-09 RX ORDER — ROPIVACAINE HYDROCHLORIDE 2 MG/ML
INJECTION, SOLUTION EPIDURAL; INFILTRATION; PERINEURAL
Status: COMPLETED
Start: 2024-10-09 | End: 2024-10-09

## 2024-10-09 RX ORDER — VITAMIN A, ASCORBIC ACID, CHOLECALCIFEROL, .ALPHA.-TOCOPHEROL ACETATE, DL-, THIAMINE MONONITRATE, RIBOFLAVIN, NIACINAMIDE, PYRIDOXINE HYDROCHLORIDE, FOLIC ACID, CYANOCOBALAMIN, CALCIUM CARBONATE, IRON, ZINC OXIDE, AND CUPRIC OXIDE 4000; 120; 400; 22; 1.84; 3; 20; 10; 1; 12; 200; 29; 25; 2 [IU]/1; MG/1; [IU]/1; [IU]/1; MG/1; MG/1; MG/1; MG/1; MG/1; UG/1; MG/1; MG/1; MG/1; MG/1
1 TABLET ORAL DAILY
Status: DISCONTINUED | OUTPATIENT
Start: 2024-10-09 | End: 2024-10-11 | Stop reason: HOSPADM

## 2024-10-09 RX ORDER — PHENYLEPHRINE HCL IN 0.9% NACL 1 MG/10 ML
SYRINGE (ML) INTRAVENOUS
Status: DISCONTINUED | OUTPATIENT
Start: 2024-10-09 | End: 2024-10-09 | Stop reason: SDUPTHER

## 2024-10-09 RX ORDER — POLYETHYLENE GLYCOL 3350 17 G/17G
17 POWDER, FOR SOLUTION ORAL DAILY PRN
Status: DISCONTINUED | OUTPATIENT
Start: 2024-10-09 | End: 2024-10-11 | Stop reason: HOSPADM

## 2024-10-09 RX ORDER — PROCHLORPERAZINE EDISYLATE 5 MG/ML
10 INJECTION INTRAMUSCULAR; INTRAVENOUS ONCE
Status: COMPLETED | OUTPATIENT
Start: 2024-10-09 | End: 2024-10-09

## 2024-10-09 RX ADMIN — ONDANSETRON 4 MG: 2 INJECTION INTRAMUSCULAR; INTRAVENOUS at 17:06

## 2024-10-09 RX ADMIN — PENICILLIN G POTASSIUM 5 MILLION UNITS: 5000000 INJECTION, POWDER, FOR SOLUTION INTRAMUSCULAR; INTRAVENOUS at 00:17

## 2024-10-09 RX ADMIN — SODIUM CHLORIDE, POTASSIUM CHLORIDE, SODIUM LACTATE AND CALCIUM CHLORIDE: 600; 310; 30; 20 INJECTION, SOLUTION INTRAVENOUS at 00:14

## 2024-10-09 RX ADMIN — CEPHALEXIN 500 MG: 500 CAPSULE ORAL at 23:36

## 2024-10-09 RX ADMIN — SODIUM CHLORIDE, POTASSIUM CHLORIDE, SODIUM LACTATE AND CALCIUM CHLORIDE: 600; 310; 30; 20 INJECTION, SOLUTION INTRAVENOUS at 07:13

## 2024-10-09 RX ADMIN — LIDOCAINE HYDROCHLORIDE,EPINEPHRINE BITARTRATE 3 ML: 15; .005 INJECTION, SOLUTION EPIDURAL; INFILTRATION; INTRACAUDAL; PERINEURAL at 04:15

## 2024-10-09 RX ADMIN — LIDOCAINE HYDROCHLORIDE,EPINEPHRINE BITARTRATE 2 ML: 15; .005 INJECTION, SOLUTION EPIDURAL; INFILTRATION; INTRACAUDAL; PERINEURAL at 04:19

## 2024-10-09 RX ADMIN — LIDOCAINE HYDROCHLORIDE 2 ML: 20 INJECTION, SOLUTION EPIDURAL; INFILTRATION; INTRACAUDAL; PERINEURAL at 17:38

## 2024-10-09 RX ADMIN — PROCHLORPERAZINE EDISYLATE 10 MG: 5 INJECTION INTRAMUSCULAR; INTRAVENOUS at 02:43

## 2024-10-09 RX ADMIN — MORPHINE SULFATE 2 MG: 1 INJECTION, SOLUTION EPIDURAL; INTRATHECAL; INTRAVENOUS at 18:04

## 2024-10-09 RX ADMIN — Medication 150 MCG: at 18:05

## 2024-10-09 RX ADMIN — MORPHINE SULFATE 2 MG: 2 INJECTION, SOLUTION INTRAMUSCULAR; INTRAVENOUS at 19:54

## 2024-10-09 RX ADMIN — SODIUM CHLORIDE, POTASSIUM CHLORIDE, SODIUM LACTATE AND CALCIUM CHLORIDE: 600; 310; 30; 20 INJECTION, SOLUTION INTRAVENOUS at 23:33

## 2024-10-09 RX ADMIN — ONDANSETRON 4 MG: 2 INJECTION INTRAMUSCULAR; INTRAVENOUS at 06:19

## 2024-10-09 RX ADMIN — DEXAMETHASONE SODIUM PHOSPHATE 10 MG: 10 INJECTION INTRAMUSCULAR; INTRAVENOUS at 17:52

## 2024-10-09 RX ADMIN — AZITHROMYCIN MONOHYDRATE 500 MG: 500 INJECTION, POWDER, LYOPHILIZED, FOR SOLUTION INTRAVENOUS at 17:18

## 2024-10-09 RX ADMIN — LIDOCAINE HYDROCHLORIDE 9 ML: 20 INJECTION, SOLUTION EPIDURAL; INFILTRATION; INTRACAUDAL; PERINEURAL at 17:12

## 2024-10-09 RX ADMIN — ONDANSETRON 4 MG: 2 INJECTION INTRAMUSCULAR; INTRAVENOUS at 14:38

## 2024-10-09 RX ADMIN — PHENYLEPHRINE HYDROCHLORIDE 50 MCG/MIN: 10 INJECTION INTRAVENOUS at 17:06

## 2024-10-09 RX ADMIN — SODIUM BICARBONATE 1 MEQ: 84 INJECTION, SOLUTION INTRAVENOUS at 17:03

## 2024-10-09 RX ADMIN — ACETAMINOPHEN 1000 MG: 500 TABLET ORAL at 16:49

## 2024-10-09 RX ADMIN — Medication 2.5 MILLION UNITS: at 03:33

## 2024-10-09 RX ADMIN — MORPHINE SULFATE 10 MG: 10 INJECTION INTRAVENOUS at 02:42

## 2024-10-09 RX ADMIN — Medication 7 ML: at 04:23

## 2024-10-09 RX ADMIN — MORPHINE SULFATE 4 MG: 1 INJECTION, SOLUTION EPIDURAL; INTRATHECAL; INTRAVENOUS at 17:40

## 2024-10-09 RX ADMIN — Medication 100 MCG: at 17:53

## 2024-10-09 RX ADMIN — LIDOCAINE HYDROCHLORIDE 3 ML: 20 INJECTION, SOLUTION EPIDURAL; INFILTRATION; INTRACAUDAL; PERINEURAL at 18:02

## 2024-10-09 RX ADMIN — ROPIVACAINE HYDROCHLORIDE 8 ML/HR: 2 INJECTION, SOLUTION EPIDURAL; INFILTRATION at 14:18

## 2024-10-09 RX ADMIN — SODIUM CHLORIDE, POTASSIUM CHLORIDE, SODIUM LACTATE AND CALCIUM CHLORIDE: 600; 310; 30; 20 INJECTION, SOLUTION INTRAVENOUS at 18:29

## 2024-10-09 RX ADMIN — Medication 1 MILLI-UNITS/MIN: at 04:34

## 2024-10-09 RX ADMIN — FAMOTIDINE 20 MG: 10 INJECTION, SOLUTION INTRAVENOUS at 16:45

## 2024-10-09 RX ADMIN — SODIUM CHLORIDE, POTASSIUM CHLORIDE, SODIUM LACTATE AND CALCIUM CHLORIDE: 600; 310; 30; 20 INJECTION, SOLUTION INTRAVENOUS at 17:18

## 2024-10-09 RX ADMIN — SODIUM CITRATE AND CITRIC ACID MONOHYDRATE 30 ML: 500; 334 SOLUTION ORAL at 16:49

## 2024-10-09 RX ADMIN — Medication 2.5 MILLION UNITS: at 11:49

## 2024-10-09 RX ADMIN — Medication 909 ML/HR: at 17:39

## 2024-10-09 RX ADMIN — Medication 1 MILLI-UNITS/MIN: at 11:07

## 2024-10-09 RX ADMIN — Medication 100 MCG: at 18:01

## 2024-10-09 RX ADMIN — Medication 2.5 MILLION UNITS: at 08:00

## 2024-10-09 RX ADMIN — Medication 8 ML/HR: at 04:24

## 2024-10-09 RX ADMIN — SODIUM CHLORIDE, POTASSIUM CHLORIDE, SODIUM LACTATE AND CALCIUM CHLORIDE: 600; 310; 30; 20 INJECTION, SOLUTION INTRAVENOUS at 14:07

## 2024-10-09 RX ADMIN — Medication 2.5 MILLION UNITS: at 15:59

## 2024-10-09 RX ADMIN — NALBUPHINE HYDROCHLORIDE 10 MG: 10 INJECTION, SOLUTION INTRAMUSCULAR; INTRAVENOUS; SUBCUTANEOUS at 00:14

## 2024-10-09 RX ADMIN — SODIUM CHLORIDE, POTASSIUM CHLORIDE, SODIUM LACTATE AND CALCIUM CHLORIDE: 600; 310; 30; 20 INJECTION, SOLUTION INTRAVENOUS at 19:02

## 2024-10-09 RX ADMIN — METRONIDAZOLE 500 MG: 500 TABLET ORAL at 23:36

## 2024-10-09 RX ADMIN — Medication 100 MCG: at 17:25

## 2024-10-09 RX ADMIN — Medication 8 ML/HR: at 14:18

## 2024-10-09 RX ADMIN — METHYLERGONOVINE MALEATE 200 MCG: 0.2 INJECTION, SOLUTION INTRAMUSCULAR; INTRAVENOUS at 17:42

## 2024-10-09 RX ADMIN — MIDAZOLAM 2 MG: 1 INJECTION INTRAMUSCULAR; INTRAVENOUS at 18:09

## 2024-10-09 RX ADMIN — Medication 2000 MG: at 16:51

## 2024-10-09 RX ADMIN — KETOROLAC TROMETHAMINE 30 MG: 30 INJECTION, SOLUTION INTRAMUSCULAR; INTRAVENOUS at 19:54

## 2024-10-09 RX ADMIN — Medication 87.3 MILLI-UNITS/MIN: at 18:46

## 2024-10-09 RX ADMIN — TRANEXAMIC ACID 1 G: 10 INJECTION, SOLUTION INTRAVENOUS at 17:16

## 2024-10-09 RX ADMIN — LIDOCAINE HYDROCHLORIDE 9 ML: 20 INJECTION, SOLUTION EPIDURAL; INFILTRATION; INTRACAUDAL; PERINEURAL at 17:03

## 2024-10-09 RX ADMIN — ACETAMINOPHEN 1000 MG: 500 TABLET ORAL at 23:30

## 2024-10-09 RX ADMIN — SODIUM BICARBONATE 1 MEQ: 84 INJECTION, SOLUTION INTRAVENOUS at 17:12

## 2024-10-09 RX ADMIN — CARBOPROST TROMETHAMINE 250 MCG: 250 INJECTION, SOLUTION INTRAMUSCULAR at 19:09

## 2024-10-09 RX ADMIN — Medication 100 MCG: at 17:31

## 2024-10-09 ASSESSMENT — PAIN DESCRIPTION - LOCATION
LOCATION: ABDOMEN;BACK
LOCATION: ABDOMEN
LOCATION: ABDOMEN;BACK
LOCATION: INCISION

## 2024-10-09 ASSESSMENT — PAIN DESCRIPTION - DESCRIPTORS
DESCRIPTORS: CRAMPING
DESCRIPTORS: DISCOMFORT
DESCRIPTORS: CRAMPING;ACHING
DESCRIPTORS: CRAMPING

## 2024-10-09 ASSESSMENT — PAIN - FUNCTIONAL ASSESSMENT: PAIN_FUNCTIONAL_ASSESSMENT: ACTIVITIES ARE NOT PREVENTED

## 2024-10-09 ASSESSMENT — PAIN SCALES - GENERAL
PAINLEVEL_OUTOF10: 7
PAINLEVEL_OUTOF10: 9
PAINLEVEL_OUTOF10: 6
PAINLEVEL_OUTOF10: 3

## 2024-10-09 ASSESSMENT — PAIN DESCRIPTION - ORIENTATION: ORIENTATION: LOWER

## 2024-10-09 NOTE — SIGNIFICANT EVENT
Significant Event Note    This resident called to bedside for post-op bleeding. Upon entering the room, large amount of bright red blood noted on pads and bed. Patient normotensive but tachycardic to the 140s. Senior resident, Dr. Dc, called. Bimanual exam was performed revealing large burden of clots in the uterus which were removed; uterus was also noted to be boggy. Tone poor throughout uterus including the fundus. In-house attending, Dr. Vigil, arrives at bedside. Intraoperative QBL was 900 mL. Given amount of bleeding noted on exam and EBL, patient noted to meet criteria for postpartum hemorrhage.   Patient received TXA pre-operatively, and methergine x1 intraoperatively. Decision was made to give hemabate x1. This resident called for Ericka balloon. Senior resident arrives at bedside. At this time, patient was noted to be hypotensive with MAP in the 50s. Stat labs were verbally ordered and drawn by RN. Second line was also placed at this time. Fluid bolus initiated. Tone still noted to be poor on fundal exam. Repeat bimanual exam and BSUS revealed no remaining clot burden. Ericka balloon placed, hooked to tubing and wall suction at 80 mmHg. The cervical seal was filled with 120 mL sterile fluid. Return of blood noted in Ericka but not brisk. No bleeding noted around Ericka balloon. Patient's BP noted to be normotensive with HR in 110s.  Patient stable. Morphine 2mg IV x1 ordered for pain control. QBL post-operatively 1115 mL. Total QBL now 2,015 mL. Attending updated. Will continue to monitor with plan to reassess Ericka in one hour.       Irma Solitario MD  OB/GYN Resident, PGY1  [place], [place]  10/9/2024, 7:25 PM

## 2024-10-09 NOTE — PROGRESS NOTES
Labor Progress Note    Jennifer Ramirez is a 29 y.o. female  at 39w1d  Resident called to room for deceleration. The patient was seen and examined. Her pain is well controlled. She reports fetal movement is present, complains of contractions, complains of loss of fluid, complains of vaginal bleeding.       Vital Signs:  Vitals:    10/09/24 0800 10/09/24 0830 10/09/24 0900 10/09/24 0930   BP: 103/64 108/67 116/86 98/64   Pulse: 80 77 75 71   Resp:   18 18   Temp: 98 °F (36.7 °C)      TempSrc: Oral      SpO2: 98% 100% 100% 100%   Weight:       Height:             FHT:  125 , moderate variability, accelerations present, variable deceleration present, prolonged deceleration beginning at 0909 to jeromy of 60, with return to baseline at 0916   Contractions: regular, every 2 minutes    Chaperone for Intimate Exam: Chaperone was present for entire exam, Chaperone Name: HECTOR Mosley  Cervical Exam: 8 cm dilated, 90 effaced, 0 station  Pitocin: 8 > 4 mu/min > off    Membranes: Ruptured clear fluid  Scalp Electrode in place: absent  Intrauterine Pressure Catheter in Place: present    Interventions: maternal repositioning, pitocin halved then turned off, fluid bolus initiated     Assessment/Plan:  Jennifer Ramirez is a 29 y.o. female  at 39w1d admitted for spontaneous labor    - GBS positive, Pen G for GBS prophylaxis  - VSS, afebrile    - cEFM and TOCO: cat 2 toco w/ regular ctx    - Upon review of prolonged deceleration, it appears that fetal tracing may resemble maternal. However, will still identify change as 7 minute prolonged deceleration with moderate variability throughout deceleration. Deceleration resolved after turning off pitocin and repositioning mother to hands and knees position. Fluid bolus was also initiated given concern for tachysystole. Rapid cervical change also present as patient has progressed from 6>8 in about one hour.  Given moderate variability throughout deceleration and reassuring tracing after

## 2024-10-09 NOTE — BRIEF OP NOTE
Department of Obstetrics and Gynecology  Obstetrical Brief Operative Report  [facility]    Patient: Jennifer Ramirez   : 1995  MRN: [id number]       Acct: [id number]   Date of Procedure: 10/9/24    Pre-operative Diagnosis:   29 y.o. female  at 39w1d   Arrest of Dilation  Category Two Fetal Heart Tracing  Spontaneous Labor   Elevated Blood Pressure without Diagnosis of Hypertension  History of Cervical Lesion (Pap NILM 23)  Nuchal Fold Thickening on First Trimester Screen  BMI 38    Post-operative Diagnosis:    living infant male  29 y.o. female    Arrest of Dilation  Category Two Fetal Heart Tracing  Spontaneous Labor   Elevated Blood Pressure without Diagnosis of Hypertension  History of Cervical Lesion (Pap NILM 23)  Nuchal Fold Thickening on First Trimester Screen  BMI 38    Procedure: primary low transverse  section    Surgeon: Dr. Mortensen  Assistant(s): Jayleen Jaimes DO, PGY2; Irma Solitario MD, PGY1    Anesthesia: Epidural     Information for the patient's :  Dc Ramirez [[id number]]   male   Birth Weight: 3.19 kg (7 lb 0.5 oz)  Information for the patient's :  Dc Ramirez [[id number]]        Findings:  Live Born 7 lb 0 oz male infant in cephalic presentation with Apgars of 1 at 1 minute and 9 at five minutes, right hysterotomy extension, normal appearing uterus, tubes, and ovaries, normal appearing intact bladder visualized after backfilling of bladder with sterile milk  Estimated Blood Loss: 900ml immediately post-operatively  Total IV Fluids: 1000 ml  Urine output: 380 mL clear urine   Drains:  castillo catheter  Specimens:  placenta sent to pathology, cord blood, and cord gases  Instrument and Sponge Count: Correct  Complications: none  Condition: Infant unstable, transfer to Special Care Nursery, Mother stable, transfer to post anesthesia recovery    See operative report for full details.    Irma Solitario MD  Ob/Gyn

## 2024-10-09 NOTE — CARE COORDINATION
ANTEPARTUM NOTE    39 weeks gestation of pregnancy [Z3A.39]    Jennifer was admitted to L&D on 10/8/2024 for contractions/spontaneous labor @ 39 0/7    OB GYN Provider: Dr Burnham    Will meet with patient after delivery to verify name/address/phone/insurance and discuss discharge planning.     Anticipate DC home 2 nights after vaginal delivery or 4 nights after C/S delivery as long as hemodynamically stable.

## 2024-10-09 NOTE — PROGRESS NOTES
Obstetric/Gynecology Resident Interval Note    Resident to bedside to evaluate patient. Discussed with patient and family that she has been at 9 cm dilation/posterior lip since 1005 this morning. Informed patient that six hours without cervical change in the active stage of labor is a medical indication for  delivery. Discussed with patient that this lack of progression in labor may be secondary to several reasons: asynclitic presentation, nuchal or body cord, pelvimetry. Discussed with patient that she still has autonomy to decide if she would like to proceed with vaginal delivery or  section. Cervical exam completed, and SVE remains unchanged. Informed patient that she meets criteria for arrest of dilation which is an indication for  delivery. Discussed with patient that baby has had multiple decelerations and periods of category 2 tracing throughout the day with intermittent periods of cat 1 tracing.   Initially, patient desires to proceed with labor and vaginal delivery but, before this resident leaves the room, 9 minute prolonged deceleration to jeromy of 80 present. Senior resident called to bedside. Multiple position changes attempted without resolution. Deceleration finally resolved at 1627 after left lateral positioning with peanut ball. Variability after deceleration also noted to be moderate-minimal.  Discussed with patient that this is the longest deceleration that baby has had today and that current clinical picture is concerning for fetal wellbeing and lack of fetal reserve. Patient now desires to proceed with  delivery. Plan to proceed with  delivery 2/2 arrest of dilation and cat 2 tracing despite resuscitative measures.     Senior resident and attending updated and in agreement with plan.     Irma Solitario MD  OB/GYN Resident, PGY1  [place], [place]  10/9/2024, 4:49 PM

## 2024-10-09 NOTE — PROGRESS NOTES
Labor Progress Note    Jennifer Ramirez is a 29 y.o. female  at 39w1d  The patient was seen and examined. Her pain is well controlled. She reports fetal movement is present, complains of contractions, complains of loss of fluid, complains of vaginal bleeding.       Vital Signs:  Vitals:    10/09/24 1230 10/09/24 1300 10/09/24 1330 10/09/24 1407   BP: 114/61 108/69 117/63 110/61   Pulse: 81 77 77 (!) 109   Resp:  18 18 18   Temp:    98.1 °F (36.7 °C)   TempSrc:    Oral   SpO2: 99% 99% 100%    Weight:       Height:           FHT: 130, moderate variability, accelerations present, decelerations absent  Contractions: irregular, every 2-3 minutes    Chaperone for Intimate Exam: Chaperone was present for entire exam, Chaperone Name: HECTOR Mosley  Cervical Exam: posterior-left lateral lip present and non-reducible  Pitocin: @ 1 mu/min    Membranes: Ruptured clear fluid  Scalp Electrode in place: absent  Intrauterine Pressure Catheter in Place: present    Interventions: none    Assessment/Plan:  Jennifer Ramirez is a 29 y.o. female  at 39w1d admitted for spontaneous labor    - GBS positive, Pen G for GBS prophylaxis              - VSS, afebrile               - cEFM and TOCO: cat 1 w/ irregular ctx              - AROM (clr) at 0455               - IUPC remains in place              - SVE: posterior-left lateral lip               - Epidural in place and functioning, s/p Morphine/Compazine x1, Nubain x1              - Continue to monitor; anticipate vaginal delivery       Attending updated and in agreement with plan    Irma Solitario MD  Ob/Gyn Resident  10/9/2024, 11:51 AM

## 2024-10-09 NOTE — PROGRESS NOTES
Labor Progress Note    Jennifer Ramirez is a 29 y.o. female  at 39w1d  The patient was seen and examined. Her pain is not well controlled. She reports fetal movement is present, complains of contractions, denies loss of fluid, denies vaginal bleeding.       Vital Signs:  Vitals:    10/08/24 2255 10/09/24 0124   BP: 134/78 135/81   Pulse: 91 79   Resp: 16 20   Temp: 98 °F (36.7 °C) 97.4 °F (36.3 °C)   TempSrc: Oral Oral   Weight: 101.6 kg (224 lb)    Height: 1.626 m (5' 4\")         FHT:  125 , moderate variability, accelerations present, decelerations absent  Contractions: irregular contractions, every 3-5 minutes     Chaperone for Intimate Exam: Chaperone was present for entire exam, Chaperone Name: HECTOR Chua  Cervical Exam: 3 cm dilated, 90 effaced, -1 station  Pitocin: @ 0 mu/min    Membranes: Intact  Scalp Electrode in place: absent  Intrauterine Pressure Catheter in Place: absent    Interventions: SVE    Assessment/Plan:  Jennifer Ramirez is a 29 y.o. female  at 39w1d admitted for spontaneous labor   - GBS positive, Pen G for GBS prophylaxis   - VSS   - cEFM/TOCO   - SVE 3/90/-1   - Patient is tolerating her contractions but is requesting morphine/compazine for pain control   - Since patient has not made any cervical change for three hours, discussed with patient about starting pitocin to increase contraction frequency and intensity. Patient is amenable.   - Plan for AROM after 4 hrs of Pen G    - Will continue to monitor closely      Attending updated and in agreement with plan    Milana Ibrahim DO  Ob/Gyn Resident  10/9/2024, 2:39 AM

## 2024-10-09 NOTE — PROGRESS NOTES
Obstetric/Gynecology Resident Interval Note    Resident to room for prolonged deceleration starting at 1348 to a jeromy of 80 lasting until 1351. Per RN, patient was being repositioned to hands and knees at start of deceleration. Pitocin running at 2 mu/min. Pitocin halved to 1 mu/min and fluid bolus initiated while resident in room. SVE performed 12 minutes prior to this deceleration revealed posterior lip that was not reducible. Overall, tracing is still reassuring for good fetal reserve despite decelerations. Patient has had intermittent intervals of cat 1 tracing and remains good candidate for vaginal delivery at this time. Will continue to monitor.     Fetal Heart Monitor:  Baseline Heart Rate 140, moderate variability, present accelerations  [place]: contractions, irregular, every 2-4 minutes      Irma Solitario MD  OB/GYN Resident, PGY1  [place], [place]  10/9/2024, 1:57 PM

## 2024-10-09 NOTE — PROGRESS NOTES
[id number] pt ambulates from recovery 3 to room 707 accompanied by SHAHNAZ Dominguez RN and Mark kauffman

## 2024-10-09 NOTE — ANESTHESIA PRE PROCEDURE
Department of Anesthesiology  Preprocedure Note       Name:  Jennifer Ramirez   Age:  29 y.o.  :  1995                                          MRN:  [id number]         Date:  10/9/2024      Surgeon: * No surgeons listed *    Procedure: * No procedures listed *    Medications prior to admission:   Prior to Admission medications    Medication Sig Start Date End Date Taking? Authorizing Provider   aspirin 81 MG chewable tablet Take 1 tablet by mouth daily   Yes ProviderWilliam MD   calcium carbonate (TUMS) 500 MG chewable tablet Take 1 tablet by mouth daily   Yes ProviderWilliam MD   Prenatal Vit-Fe Fumarate-FA (PRENATAL PO) Take by mouth   Yes ProviderWilliam MD   Misc. Devices KIT Nature's Blend Prenatal Multivitamin with Minerals ([facility])  NDC: [address]-42;  Take 1 softgel by mouth daily or as instructed by provider;  #qs for 6 months 24   Mitch Rosado MD       Current medications:    Current Facility-Administered Medications   Medication Dose Route Frequency Provider Last Rate Last Admin    calcium carbonate (TUMS) chewable tablet 1,000 mg  1,000 mg Oral TID PRN Vadim Dc MD        oxytocin (PITOCIN) 30 units in 500 mL infusion  1-20 frank-units/min IntraVENous Continuous Milana Ibrahim DO        ROPivacaine (NAROPIN) 0.2% injection 0.2%             lactated ringers bolus 500 mL  500 mL IntraVENous PRN Vadim Dc MD        Or    lactated ringers bolus 1,000 mL  1,000 mL IntraVENous PRN Vadim Dc MD        sodium chloride flush 0.9 % injection 5-40 mL  5-40 mL IntraVENous 2 times per day Vadim Dc MD        sodium chloride flush 0.9 % injection 5-40 mL  5-40 mL IntraVENous PRN Vadim Dc MD        0.9 % sodium chloride infusion   IntraVENous PRN Vadim Dc MD        acetaminophen (TYLENOL) tablet 1,000 mg  1,000 mg Oral Q6H PRN Vadim Dc MD        lactated ringers IV soln infusion   IntraVENous Continuous Vadim Dc MD 50

## 2024-10-09 NOTE — OP NOTE
Operative Note  Department of Obstetrics and Gynecology  [facility]     Patient: Jennifer Ramirez   : 1995  MRN: [id number]       Acct: [id number]   PCP: Unknown, Provider  Date of Procedure: 10/9/24    Pre-operative Diagnosis:   29 y.o. female  at 39w1d   Arrest of Dilation  Category Two Fetal Heart Tracing  Spontaneous Labor   Elevated Blood Pressure without Diagnosis of Hypertension  History of Cervical Lesion (Pap NILM 23)  Nuchal Fold Thickening on First Trimester Screen  BMI 38     Post-operative Diagnosis:   [place] living infant male  29 y.o. female    Arrest of Dilation  Category Two Fetal Heart Tracing  Spontaneous Labor   Elevated Blood Pressure without Diagnosis of Hypertension  History of Cervical Lesion (Pap NILM 23)  Nuchal Fold Thickening on First Trimester Screen  BMI 38    Procedure: primary low transverse  section    Indications: Jennifer Ramirez is a 29 y.o.  female who presented in spontaneous labor at 39w0d. Patient received epidural, AROM, pitocin, IUPC, Nubain x1, Morphine/Compazine x1 and progressed to 9 cm. Patient was noted to be at 9 cm for over six hours with multiple periods of category two tracing. Patient met criteria for arrest of dilation in the setting of category two tracing. Decision was made to proceed to the OR for  delivery secondary to arrest of dilation and category two tracing despite multiple resuscitative attempts. R/B/A were discussed, consent was signed and placed in patient chart, and patient was amenable to proceed to the OR for further evaluation and management.     Surgeon: Dr. Mortensen  Assistants: Jayleen Jaimes DO, PGY2; Irma Solitario MD, PGY1     Anesthesia: epidural    Procedure Details   The patient was seen pre-operatively. The risks, benefits, complications, treatment options, and expected outcomes were discussed with the patient. The patient concurred with the proposed plan, giving

## 2024-10-09 NOTE — PROGRESS NOTES
Pt ambulates onto 7A.  Pt states that she has been having contractions and that she lost her mucous plug.  Pt changes into gown and gives urine sample.  Education provided on call light system.  Pt states that contractions started yesterday at 0400 and have gotten closer together and more painful.  Pt rates pain 7/10.

## 2024-10-09 NOTE — PROGRESS NOTES
Obstetric/Gynecology Resident Interval Note    Writer to bedside at 1456 due to continued variable decelerations. [place] resident at bedside performing SVE reports patient is still lip/rim/100/0 station. SVE performed by writer, lip of cervix felt on patient's left side. Attempts made with strong pushing to reduce lip without success and asynclitic head. Patient began to have deceleration jeromy to 70 bpm. IVF started, Pitocin turned off. Decision made to begin amnioinfusion. Patient turned onto her left side with improvement of FHT.     Discussed abnormal fetal heart tracing and consistent SVE of 9 cm/lip for some time now with patient. Discussed that her baby likely needs to rotate and engage into her pelvis and she will be complete, but we must weigh continuing for vaginal delivery given her baby is having significant decels despite multiple intra uterine resuscitative measures.     Will try amnioninfusion to improve FHT and continue aggressive resuscitation with Pitocin break, IVF boluses and position changes. If tracing continues to be abnormal, will discuss possible  section.     Dr. Vigil updated.     Jayleen Jaimes DO  OB/GYN Resident, PGY2  [place], [place]  10/9/2024, 3:17 PM

## 2024-10-09 NOTE — PROGRESS NOTES
Labor Progress Note    Jennifer Ramirez is a 29 y.o. female  at 39w1d  The patient was seen and examined. Her pain is well controlled. She reports fetal movement is present, denies contractions, denies loss of fluid, denies vaginal bleeding.       Vital Signs:  Vitals:    10/09/24 0423 10/09/24 0424 10/09/24 0426 10/09/24 0431   BP: 120/66 (!) 120/55 (!) 101/50 (!) 117/59   Pulse: 86 84 95 92   Resp:       Temp:       TempSrc:       SpO2:   98%    Weight:       Height:            FHT: 130, moderate variability, accelerations present, one deceleration present  Contractions: regular, every 2-5 min    Chaperone for Intimate Exam: Chaperone was present for entire exam, Chaperone Name: HECTOR Chua  Cervical Exam: 4 cm dilated, 90 effaced, -1 station  Pitocin: @ 2 mu/min    Membranes: Ruptured clear fluid  Scalp Electrode in place: absent  Intrauterine Pressure Catheter in Place: present    Interventions: SVE, AROM, IUPC    Assessment/Plan:  Jennifer Ramirez is a 29 y.o. female  at 39w1d admitted for spontaneous labor   - GBS positive, Pen G for GBS prophylaxis   - VSS   - cEFM/TOCO   - SVE: /-1   - IVF: LR 50 mL/hr   - Epidural in place. Patient reports not feeling her contractions.   - AROM (clear) @ 0445   - IUPC placed   - Continue pitocin per protocol   - Will continue to monitor closely      Attending updated and in agreement with plan    Milana Ibrahim DO  Ob/Gyn Resident  10/9/2024, 4:35 AM

## 2024-10-09 NOTE — DISCHARGE SUMMARY
Obstetric Discharge Summary  [facility]    Patient Name: Jennifer Ramirez  Patient : 1995  Primary Care Physician: Unknown, Provider  Admit Date: 10/8/2024    Principal Diagnosis: IUP at 39w1d, admitted for Spontaneous Labor     Her pregnancy has been complicated by:   Patient Active Problem List   Diagnosis    Nuchal fold thickening on prenatal ultrasound    Lesion of cervix    GBS (group B Streptococcus carrier), +RV culture, currently pregnant    Elevated BP without diagnosis of hypertension    Rh+/RI/GBSpos    Arrest of dilation, delivered, current hospitalization    S/P  section    PLTCS 10/9/24 M Apg  Wt 7#0     Infection Present?: No  Hospital Acquired: No    Surgical Operations & Procedures:  Analgesia: epidural   Delivery Type:  Delivery: See Labor and Delivery Summary   Laceration(s): NA    Consultations: NICU and Anesthesia    Pertinent Findings & Procedures:   Jennifer Ramirez is a 29 y.o. female  at 39w1d admitted for spontaneous labor; received nubain x 1, morphine/compazine x 1, epidural, Pitocin, AROM (clear), IUPC, Amnioinfusion.     Patient progressed to anterior lip and made no cervical change for 6 hours. Patient also had intermittent Category II tracing unresponsive to intrauterine resuscitation including position changes, IV fluids, pitocin breaks, and amnioinfusion. Decision was made to proceed with delivery via  section secondary to arrest of dilation and Category II tracing remote from delivery    She delivered by primary low transverse  a Live Born infant on 10/9/24.       Information for the patient's :  Terrell Low [[id number]]   male   Birth Weight: 3.19 kg (7 lb 0.5 oz)    Apgars: 1 at 1 minute and 9 at 5 minutes.     ERAS for  Section  Received ERAS education outpatient: No  Consumed electrolyte-rich clear fluid prior to surgery: No  Received pre-operative Tylenol and Pepcid: Yes  Received

## 2024-10-09 NOTE — H&P
OBSTETRICAL HISTORY AND PHYSICAL  [facility]    Date: 10/8/2024       Time: 10:52 PM   Patient Name: Jennifer Ramirez     Patient : 1995  Room/Bed: REC3/REC3-01    Admission Date/Time: 10/8/2024 10:45 PM      CC: Contractions     HPI: Jennifer Ramirez is a 29 y.o.  at 39w0d who presents with consistent contractions which are increasing in frequency and intensity. The patient reports fetal movement is present, complains of contractions, denies loss of fluid, denies vaginal bleeding.      Patient denies any headache, visual changes, difficulty breathing, RUQ pain, N/V, F/C, and pain/swelling in lower extremities. Denies any dysuria or vaginal discharge.     DATING:  LMP: Patient's last menstrual period was 2024 (exact date).  Estimated Date of Delivery: 10/15/24   Based on: LMP    PREGNANCY RISK FACTORS:  Patient Active Problem List   Diagnosis    Nuchal fold thickening on prenatal ultrasound    Lesion of cervix    GBS (group B Streptococcus carrier), +RV culture, currently pregnant    Elevated BP without diagnosis of hypertension    Rh+/RI/GBSpos       REVIEW OF SYSTEMS:   Constitutional: negative fever, negative chills  HEENT: negative visual disturbances, negative headaches, negative dizziness  Breast: negative breast abnormalities, negative breast lumps, negative nipple discharge  Respiratory: negative dyspnea, negative cough, negative SOB  Cardiovascular: negative chest pain,  negative palpitations, negative arrhythmia, negative syncope   Gastrointestinal: positive contractions, negative RUQ pain, negative N/V/D, negative constipation, negative bowel changes, negative heartburn   Genitourinary: negative pelvic pain, negative dysuria, negative hematuria, negative urinary incontinence, negative vaginal discharge  Dermatological: negative rash, negative pruritis  Hematologic: negative bruising  Immunologic/Lymphatic: negative recent illness, negative recent sick  on anatomy     ASSESSMENT & PLAN:  Jennifer Ramirez is a 29 y.o. female  at 39w0d IUP   - GBS positive / Rh positive / R immune   - Pen G for GBS prophylaxis if necessary   - VSS     Spontaneous Labor   - Admit to labor and delivery under the service of Dr. Romero   - VSS    - cEFM and TOCO   - Cat 1 FHT and TOCO showing regular contractions    - CBC, T&S, T.Pal ordered   - UDS ordered. R/B/A discussed with patient and patient agreeable   - IVF: 50 ml/hr LR    - BSUS: cephalic, EFW 7#9   - SVE: 3/90/-1   - Nubain x1 ordered per request    - Continue expectant management     Elevated BP x2    - Noted on 10/1/24   - Normotensive on admission    - Denies s/s of PreE    - Monitor closely     History of cervical lesion    - Not noted on palpation on exam    - Pap NIML on 23    Nuchal Fold Thickening on FTS    - Pt follows with MFM    - NIPT low risk     BMI 38      Patient Active Problem List    Diagnosis Date Noted    Rh+/RI/GBSpos 10/08/2024    Elevated BP without diagnosis of hypertension 10/01/2024     148/90 and 150/84 on 10/1/24. Sent to L&D for Pre E work up.      GBS (group B Streptococcus carrier), +RV culture, currently pregnant 2024     PCN G in labor      Lesion of cervix 2024:  Small friable cystic lesions noted at 8 o'clock and 12 o'clock most consistent with cervical ectropion but somewhat vesicular like HSV  - No hx of HSV  - Denies s/sx   - Low clinical concern, but swabs obtained out of an abundance of caution  Herpes swab negative        Nuchal fold thickening on prenatal ultrasound 2024     NIPT Low Risk         Plan discussed with on-call physician Dr. Romero, who is agreeable.     Risks, benefits, alternatives and possible complications have been discussed in detail with the patient. Admission, and post admission procedures and expectations were discussed in detail. All questions were answered.    Patient's primary ob/gyn provider:

## 2024-10-09 NOTE — PROGRESS NOTES
Labor Progress Note    Jennifer Ramirez is a 29 y.o. female  at 39w1d  Resident called to room as patient is reporting increased pressure. The patient was seen and examined. Epidural in place, and her pain is well controlled. She reports increased and constant vaginal pressure. She reports fetal movement is present, complains of contractions, complains of loss of fluid, complains of vaginal bleeding.       Vital Signs:  Vitals:    10/09/24 0800 10/09/24 0830 10/09/24 0900 10/09/24 0930   BP: 103/64 108/67 116/86 98/64   Pulse: 80 77 75 71   Resp:   18    Temp: 98 °F (36.7 °C)      TempSrc: Oral      SpO2: 98% 100% 100% 100%   Weight:       Height:             FHT: 125, moderate variability, accelerations present, variable decelerations present to jeromy of 90 with spontaneous return to baseline  Contractions: irregular, every 2-4 minutes    Chaperone for Intimate Exam: Chaperone was present for entire exam, Chaperone Name: HECTOR Mosley  Cervical Exam: 6 cm dilated, 90 effaced, 0 station  Pitocin: @ 8 mu/min    Membranes: Ruptured clear fluid  Scalp Electrode in place: absent  Intrauterine Pressure Catheter in Place: present    Interventions: none    Assessment/Plan:  Jennifer Ramirez is a 29 y.o. female  at 39w1d admitted for spontaneous labor   - GBS positive, Pen G for GBS prophylaxis  - VSS, afebrile    - cEFM and TOCO: cat 2 w/ irregular contractions, variable decelerations spontaneously returned to baseline, overall tracing remains reassuring at this time as moderate variability is appreciated indicating good fetal reserve   - AROM (clr) at 0455    - IUPC remains in place   - SVE 6/90%/0   - Epidural in place and functioning, s/p Morphine/Compazine x1, Nubain x1   - Continue to monitor; anticipate vaginal delivery    Attending updated and in agreement with plan    Irma Solitario MD  Ob/Gyn Resident  10/9/2024, 8:25 AM

## 2024-10-09 NOTE — PROGRESS NOTES
Labor Progress Note    Jennifer Ramirez is a 29 y.o. female  at 39w1d  The patient was seen and examined. Her pain is well controlled. She reports fetal movement is present, complains of contractions, complains of loss of fluid, complains of vaginal bleeding.       Vital Signs:  Vitals:    10/09/24 1030 10/09/24 1101 10/09/24 1130 10/09/24 1200   BP: 109/66 97/61 116/76 (!) 106/51   Pulse: 85 90 75 91   Resp: 18 18 18 18   Temp:    98.4 °F (36.9 °C)   TempSrc:    Oral   SpO2: 100%  100% 100%   Weight:       Height:           FHT: 130, moderate variability, accelerations present, decelerations absent  Contractions: regular, every 3 minutes    Chaperone for Intimate Exam: Chaperone was present for entire exam, Chaperone Name: HECTOR Mosley   Cervical Exam: 9 cm dilated, 90% effaced, 0 station  Pitocin: none    Membranes: Ruptured clear fluid  Scalp Electrode in place: absent  Intrauterine Pressure Catheter in Place: present    Interventions: none    Assessment/Plan:  Jennifer Ramirez is a 29 y.o. female  at 39w1d admitted for spontaneous labor   - GBS positive, Pen G for GBS prophylaxis   - VSS, afebrile               - cEFM and TOCO: cat I w/ regular ctx              - AROM (clr) at 0455               - IUPC remains in place              - SVE 9/90%/0              - Epidural in place and functioning, s/p Morphine/Compazine x1, Nubain x1              - Continue to monitor; anticipate vaginal delivery    Attending updated and in agreement with plan    Irma Solitario MD  Ob/Gyn Resident  10/9/2024, 11:04 AM

## 2024-10-09 NOTE — PROGRESS NOTES
0352,   CHRISTIAN Redmond, at bedside.  Epidural procedure explained, risks discussed.  Pt verbalizes consent for epidural.   0353 patient positioned for epidural.0355 Time out completed.   0414 catheter placed. 0415 test dose given.  Epidural catheter taped and secured per anesthesia.   0421 to low fowlers with left uterine displacement. 0422 loading dose given. 0422 pump initiated.  Pt tolerated procedure well.

## 2024-10-10 LAB
BASOPHILS # BLD: 0 K/UL (ref 0–0.2)
BASOPHILS NFR BLD: 0 % (ref 0–2)
EOSINOPHIL # BLD: 0 K/UL (ref 0–0.4)
EOSINOPHILS RELATIVE PERCENT: 0 % (ref 1–4)
ERYTHROCYTE [DISTWIDTH] IN BLOOD BY AUTOMATED COUNT: 15.2 % (ref 11.8–14.4)
HCT VFR BLD AUTO: 24.6 % (ref 36.3–47.1)
HGB BLD-MCNC: 7.5 G/DL (ref 11.9–15.1)
IMM GRANULOCYTES # BLD AUTO: 0 K/UL (ref 0–0.3)
IMM GRANULOCYTES NFR BLD: 0 %
LYMPHOCYTES NFR BLD: 1.93 K/UL (ref 1–4.8)
LYMPHOCYTES RELATIVE PERCENT: 8 % (ref 24–44)
MCH RBC QN AUTO: 26.3 PG (ref 25.2–33.5)
MCHC RBC AUTO-ENTMCNC: 30.5 G/DL (ref 28.4–34.8)
MCV RBC AUTO: 86.3 FL (ref 82.6–102.9)
MONOCYTES NFR BLD: 0.48 K/UL (ref 0.1–0.8)
MONOCYTES NFR BLD: 2 % (ref 1–7)
MORPHOLOGY: ABNORMAL
NEUTROPHILS NFR BLD: 90 % (ref 36–66)
NEUTS SEG NFR BLD: 21.69 K/UL (ref 1.8–7.7)
NRBC BLD-RTO: 0 PER 100 WBC
PLATELET # BLD AUTO: 225 K/UL (ref 138–453)
PMV BLD AUTO: 11.9 FL (ref 8.1–13.5)
RBC # BLD AUTO: 2.85 M/UL (ref 3.95–5.11)
WBC OTHER # BLD: 24.1 K/UL (ref 3.5–11.3)

## 2024-10-10 PROCEDURE — 2580000003 HC RX 258

## 2024-10-10 PROCEDURE — 6360000002 HC RX W HCPCS

## 2024-10-10 PROCEDURE — 6370000000 HC RX 637 (ALT 250 FOR IP)

## 2024-10-10 PROCEDURE — 36415 COLL VENOUS BLD VENIPUNCTURE: CPT

## 2024-10-10 PROCEDURE — 85025 COMPLETE CBC W/AUTO DIFF WBC: CPT

## 2024-10-10 PROCEDURE — 1220000000 HC SEMI PRIVATE OB R&B

## 2024-10-10 RX ORDER — FERROUS SULFATE 325(65) MG
325 TABLET ORAL
Qty: 30 TABLET | Refills: 1 | Status: SHIPPED | OUTPATIENT
Start: 2024-10-10

## 2024-10-10 RX ORDER — IBUPROFEN 600 MG/1
600 TABLET, FILM COATED ORAL EVERY 6 HOURS SCHEDULED
Status: DISCONTINUED | OUTPATIENT
Start: 2024-10-10 | End: 2024-10-11 | Stop reason: HOSPADM

## 2024-10-10 RX ORDER — KETOROLAC TROMETHAMINE 30 MG/ML
30 INJECTION, SOLUTION INTRAMUSCULAR; INTRAVENOUS EVERY 6 HOURS
Status: DISCONTINUED | OUTPATIENT
Start: 2024-10-10 | End: 2024-10-10

## 2024-10-10 RX ORDER — KETOROLAC TROMETHAMINE 30 MG/ML
30 INJECTION, SOLUTION INTRAMUSCULAR; INTRAVENOUS EVERY 6 HOURS
Status: COMPLETED | OUTPATIENT
Start: 2024-10-10 | End: 2024-10-10

## 2024-10-10 RX ORDER — DIPHENHYDRAMINE HYDROCHLORIDE 50 MG/ML
25 INJECTION INTRAMUSCULAR; INTRAVENOUS EVERY 6 HOURS PRN
Status: DISCONTINUED | OUTPATIENT
Start: 2024-10-10 | End: 2024-10-11 | Stop reason: HOSPADM

## 2024-10-10 RX ORDER — DIPHENHYDRAMINE HCL 25 MG
25 TABLET ORAL EVERY 6 HOURS PRN
Status: DISCONTINUED | OUTPATIENT
Start: 2024-10-10 | End: 2024-10-11 | Stop reason: HOSPADM

## 2024-10-10 RX ORDER — IBUPROFEN 800 MG/1
800 TABLET, FILM COATED ORAL EVERY 6 HOURS SCHEDULED
Status: DISCONTINUED | OUTPATIENT
Start: 2024-10-10 | End: 2024-10-10

## 2024-10-10 RX ADMIN — METRONIDAZOLE 500 MG: 500 TABLET ORAL at 21:53

## 2024-10-10 RX ADMIN — ACETAMINOPHEN 1000 MG: 500 TABLET ORAL at 12:39

## 2024-10-10 RX ADMIN — CEPHALEXIN 500 MG: 500 CAPSULE ORAL at 21:53

## 2024-10-10 RX ADMIN — IBUPROFEN 600 MG: 600 TABLET, FILM COATED ORAL at 21:53

## 2024-10-10 RX ADMIN — Medication 1 TABLET: at 09:04

## 2024-10-10 RX ADMIN — METRONIDAZOLE 500 MG: 500 TABLET ORAL at 05:49

## 2024-10-10 RX ADMIN — SIMETHICONE 80 MG: 80 TABLET, CHEWABLE ORAL at 16:48

## 2024-10-10 RX ADMIN — ACETAMINOPHEN 1000 MG: 500 TABLET ORAL at 05:49

## 2024-10-10 RX ADMIN — METRONIDAZOLE 500 MG: 500 TABLET ORAL at 15:22

## 2024-10-10 RX ADMIN — CEPHALEXIN 500 MG: 500 CAPSULE ORAL at 15:22

## 2024-10-10 RX ADMIN — SODIUM CHLORIDE, PRESERVATIVE FREE 10 ML: 5 INJECTION INTRAVENOUS at 09:04

## 2024-10-10 RX ADMIN — SODIUM CHLORIDE 300 MG: 9 INJECTION, SOLUTION INTRAVENOUS at 15:16

## 2024-10-10 RX ADMIN — KETOROLAC TROMETHAMINE 30 MG: 30 INJECTION, SOLUTION INTRAMUSCULAR; INTRAVENOUS at 01:56

## 2024-10-10 RX ADMIN — ACETAMINOPHEN 1000 MG: 500 TABLET ORAL at 18:44

## 2024-10-10 RX ADMIN — SENNOSIDES AND DOCUSATE SODIUM 2 TABLET: 50; 8.6 TABLET ORAL at 09:04

## 2024-10-10 RX ADMIN — KETOROLAC TROMETHAMINE 30 MG: 30 INJECTION, SOLUTION INTRAMUSCULAR; INTRAVENOUS at 09:04

## 2024-10-10 RX ADMIN — SODIUM CHLORIDE, PRESERVATIVE FREE 10 ML: 5 INJECTION INTRAVENOUS at 21:54

## 2024-10-10 RX ADMIN — KETOROLAC TROMETHAMINE 30 MG: 30 INJECTION, SOLUTION INTRAMUSCULAR; INTRAVENOUS at 15:22

## 2024-10-10 RX ADMIN — ENOXAPARIN SODIUM 40 MG: 100 INJECTION SUBCUTANEOUS at 09:04

## 2024-10-10 RX ADMIN — CEPHALEXIN 500 MG: 500 CAPSULE ORAL at 05:49

## 2024-10-10 RX ADMIN — SENNOSIDES AND DOCUSATE SODIUM 2 TABLET: 50; 8.6 TABLET ORAL at 21:53

## 2024-10-10 ASSESSMENT — PAIN SCALES - GENERAL
PAINLEVEL_OUTOF10: 4
PAINLEVEL_OUTOF10: 4
PAINLEVEL_OUTOF10: 3
PAINLEVEL_OUTOF10: 3

## 2024-10-10 ASSESSMENT — PAIN DESCRIPTION - DESCRIPTORS
DESCRIPTORS: CRAMPING
DESCRIPTORS: BURNING;CRAMPING
DESCRIPTORS: CRAMPING;BURNING
DESCRIPTORS: ACHING;CRAMPING;DISCOMFORT

## 2024-10-10 ASSESSMENT — PAIN DESCRIPTION - PAIN TYPE: TYPE: SURGICAL PAIN

## 2024-10-10 ASSESSMENT — PAIN DESCRIPTION - LOCATION
LOCATION: ABDOMEN

## 2024-10-10 ASSESSMENT — PAIN DESCRIPTION - FREQUENCY: FREQUENCY: INTERMITTENT

## 2024-10-10 ASSESSMENT — PAIN - FUNCTIONAL ASSESSMENT: PAIN_FUNCTIONAL_ASSESSMENT: ACTIVITIES ARE NOT PREVENTED

## 2024-10-10 NOTE — PROGRESS NOTES
Obstetric/Gynecology Resident Interval Note    Writer at patient's bedside to re-evaluate. Patient is in good spirits and reports adequate pain control. Uterine fundus is firm and at the umbilicus. AURORA device has remained in the uterus for about one hour with 80 mmHg suction and off suction for 30 minutes. AURORA tubing is notable for minimal amount of blood and no blood in the cannister. Due to patient's bleeding being stable, writer instructed RN to remove fluid from cervical seal. AURORA device removed from the vagina with no gushes of blood noted after fundal massage. Patient tolerated it well. Will continue to monitor closely.     Vitals:    10/09/24 2210 10/09/24 2225 10/09/24 2330 10/10/24 0430   BP: 95/74 (!) 95/51 114/70 126/77   Pulse:   86 85   Resp:   18 16   Temp:   98.1 °F (36.7 °C) 97.5 °F (36.4 °C)   TempSrc:   Oral Oral   SpO2:   98%    Weight:       Height:           Milana Ibrahim DO  OB/GYN Resident, PGY 1  [place], [place]  10/10/2024, 5:39 AM

## 2024-10-10 NOTE — FLOWSHEET NOTE
RN assist patient to get up for first time, patient moves without difficulty and ambulates to restroom with no complaints of dizziness or lightheadedness.  Mavis care performed, mesh underwear and pad applied, gown changed.      Pt educated that she is welcome to get up and move around as long as no complaints of dizziness/lightheadedness, pt verbalizes understanding.    After patient back in bed breast pump set up and mother educated on pumping breasts, the equipment and the cleaning process.   Encouraged to pump every 2-4 hours. Mother verbalizes and demonstrates understanding.

## 2024-10-10 NOTE — CARE COORDINATION
CASE MANAGEMENT POST-PARTUM TRANSITIONAL CARE PLAN    39 weeks gestation of pregnancy [Z3A.39]    OB Provider: Dr. Mortensen    Writer met w/ Jennifer and BF/FOB Ten Jamila at 's bedside to discuss DCP. She is S/P C/S on 10/9/24 @ 39w1d at 1737 of male infant.     Due to need for PPV after delivery  was transferred to [facility] ([facility] NICU for post resuscitation care    Name on birth certificate: Terrell Low  PCP: Dr. Joaquín Rodriguez and Mark were verbally notified of the following [facility] information :  Discussed family centered rounds occur @ 11:30 am daily, notified bedside sign-out occurs daily 8a, 8p  Discussed/offered Home Away from Home (HAFH) and/or [facility] ([facility] when applicable  Discussed the Nicu is a locked unit and need for ID band through infant's discharge  Notified that FOB and any visitors must go to 6th floor and obtain a badge or sticker, all visitors must be over the age of 18 and with a bandholder. Total visitors are limited to 3 total at the bedside.    Writer verified address/phone number correct on facesheet. She states she lives with BF/FOB Mark Maranda ([phone]). She denied barriers with transportation home, to doctor's appointments or with paying for medications upon discharge home.     [facility] insurance correct. Writer notified them they have 30 days from date of birth to add  to insurance policy. They verbalized understanding and asked if could add to both. CM informed yes, it is their choice. Explained how primary and secondary benefits are determined and due to Mark's birthday being 89- she would be primary. Also explained she will need to add via workday. They verbalized understanding    CM faxed copy of [facility] insurance card to HEL[facility]    DME: BP cuff  HOME CARE: None    Anticipate DC home in private vehicle in 2-4 days status post C/S.    Readmission Risk              Risk of Unplanned Readmission:

## 2024-10-10 NOTE — PROGRESS NOTES
POST OPERATIVE DAY # 1    Jennifer Ramirez is a 29 y.o. female   This patient was seen and examined today.     Her pregnancy was complicated by:   Patient Active Problem List   Diagnosis    Nuchal fold thickening on prenatal ultrasound    Lesion of cervix    GBS (group B Streptococcus carrier), +RV culture, currently pregnant    Elevated BP without diagnosis of hypertension    Rh+/RI/GBSpos    Arrest of dilation, delivered, current hospitalization    S/P  section    PLTCS 10/9/24 M Apg  Wt 7#0       Today she is doing well without any chief complaint. Her lochia is light. She denies chest pain, shortness of breath, and headache. She is breast and bottle feeding and she denies any signs or symptoms of mastitis.  She has not ambulated. She has a castillo in place. She currently denies S/S of postpartum depression. Flatus absent.  Bowel movement absent. She has not eaten since her surgery.     Vital Signs:  Vitals:    10/09/24 2140 10/09/24 2210 10/09/24 2225 10/09/24 2330   BP: (!) 100/55 95/74 (!) 95/51 114/70   Pulse: 79   86   Resp: 20   18   Temp:    98.1 °F (36.7 °C)   TempSrc:    Oral   SpO2: 97%   98%   Weight:       Height:            Urine Input & Output last 24hrs:     Intake/Output Summary (Last 24 hours) at 10/10/2024 0236  Last data filed at 10/10/2024 0203  Gross per 24 hour   Intake 5365.03 ml   Output 5390 ml   Net -24.97 ml       Physical Exam:  General:  no apparent distress, alert, and cooperative  Neurologic:  alert, oriented, normal speech, no focal findings or movement disorder noted  Lungs:  No increased work of breathing, good air exchange  Heart:  regular rate    Abdomen: abdomen soft, non-distended, non-tender   Fundus: non-tender, normal size, firm, below umbilicus  Incision: Prevena in place, functioning   Extremities:  no calf tenderness, non edematous    Labs:  Lab Results   Component Value Date    WBC 17.9 (H) 10/09/2024    HGB 11.3 (L) 10/09/2024    HCT 36.8 10/09/2024    MCV 84.6

## 2024-10-10 NOTE — PROGRESS NOTES
POST OPERATIVE DAY # 2    Jennifer Ramirez is a 29 y.o. female   This patient was seen and examined today.     Her pregnancy was complicated by:   Patient Active Problem List   Diagnosis    Nuchal fold thickening on prenatal ultrasound    Lesion of cervix    GBS (group B Streptococcus carrier), +RV culture, currently pregnant    Elevated BP without diagnosis of hypertension    Rh+/RI/GBSpos    Arrest of dilation, delivered, current hospitalization    S/P  section    PLTCS 10/9/24 M Apg  Wt 7#0       Today she is doing well without any chief complaint. Her lochia is light. She denies chest pain, shortness of breath, and headache. She is breast and bottle feeding and she denies any signs or symptoms of mastitis.  She has ambulated. She is voiding without difficulty. She currently denies S/S of postpartum depression. Flatus present.  Bowel movement absent. She is tolerating solids.     Vital Signs:  Vitals:    10/10/24 1235 10/10/24 1645 10/10/24 2153 10/11/24 0535   BP: 132/74 (!) 99/59 99/62 105/73   Pulse: 90 93 92 95   Resp: 18 16 16 16   Temp: 98.2 °F (36.8 °C) 97.9 °F (36.6 °C) 97.7 °F (36.5 °C) 97.3 °F (36.3 °C)   TempSrc: Oral Oral Oral Oral   SpO2:   98%    Weight:       Height:            Urine Input & Output last 24hrs:     Intake/Output Summary (Last 24 hours) at 10/11/2024 0620  Last data filed at 10/10/2024 1235  Gross per 24 hour   Intake --   Output 200 ml   Net -200 ml       Physical Exam:  General:  no apparent distress, alert, and cooperative  Neurologic:  alert, oriented, normal speech, no focal findings or movement disorder noted  Lungs:  No increased work of breathing, good air exchange  Heart:  regular rate    Abdomen: abdomen soft, non-distended, non-tender   Fundus: non-tender, normal size, firm, below umbilicus  Incision: Prevena in place, functioning   Extremities:  no calf tenderness, non edematous    Labs:  Lab Results   Component Value Date    WBC 24.1 (H) 10/10/2024    HGB 7.5  (L) 10/10/2024    HCT 24.6 (L) 10/10/2024    MCV 86.3 10/10/2024     10/10/2024       Assessment/Plan:  Jennifer Ramirez is a  POD # 2 s/p PLTCS   - Doing well, VSS    - male infant in NICU, circumcision done   - Encourage ambulation and use of incentive spirometer   - D/C castillo catheter and saline lock IV on POD #1   - POD#1 Hgb 7.5   - Vivian/Motrin/Tylenol   - Keflex/Flagyl x48 hrs   - Lovenox 40 mg qd  Rh positive/Rubella immune  - Rhogam/MMR not indicated at this time  Breast and bottle feeding  - Denies s/sx mastitis  PPH (QBL 2015mL)  - VSS  - TXA x 1 given pre-operatively  - Required Methergine x 1 (intra op) and Hemabate x 1 (post op)  - S/p AURORA  - Hgb 11.4 on admission  - POD#0 Hgb 11.3 immediately before AURORA placement  - Coags wnl  - POD#1 Hgb 7.5  - S/p IV Venofer on 10/10/24  - PO iron sent  Elv BP x2 (10/1)   - BP normotensive   - Denies s/sx of PreE   - Will continue to monitor closely  Lesion of cervix   - Pap NIML 23  - Not noted on SVE on admission    BMI 38    Continue post-op care.    Counseling Completed:  Secondary Smoke risks and Sudden Infant Death Syndrome were reviewed with recommendations. Infant sleeping, \"back to sleep\" and avoidance of co-sleeping recommendations were reviewed.  Signs and Symptoms of Post Partum Depression were reviewed. The patient is to call if any occur.  Signs and symptoms of Mastitis were reviewed. The patient is to call if any occur for follow up.  Discharge instructions including pelvic rest, incision care, 15 lb weight restriction, no driving with pain medicine and office follow-up were reviewed with patient     Attending Physician: Dr. Monik Ibrahim,   Ob/Gyn Resident  10/11/2024, 6:20 AM    Attending Physician Statement  I have personally seen, evaluated and discussed the care of Jennifer Ramirez, including pertinent history and exam findings with the resident. I have reviewed and edited their note in the electronic medical record.

## 2024-10-10 NOTE — FLOWSHEET NOTE
Patient admitted to room 749 from L&D via stretcher.   Oriented to room and surroundings.  Plan of care reviewed.  Verbalized understanding.  Preventing falls education provided .The following handouts given: A New Beginning: Your Guide to Postpartum Care, Rounding, gs Security System,Babies Cry A lot, Safe Sleep, Security and Visitation Guidelines.   Call light placed within reach.

## 2024-10-10 NOTE — SIGNIFICANT EVENT
Called to bedside by Dr. Rossi for postpartum hemorrhage. Upon arrival, bimanual exam had been performed and removed a significant clot burden ~600mL with slight improvement of tone but overall poor tone noted. This was consistent with atony which was not present from the OR previously. Hemabate was ordered. QBL was started running  1800mL at this point. Vitals noted a map in the 50s. IVF bolused and at this clinical picture jaguar was placed, uterotonics were being ordered/given. A second line and stat labs were ordered. After jaguar placement atony resolved and minimal blood in tube noted. QBL was calculated and was 2015mL. Patient Bps stable so will await CBC for transfusion need and team to discuss with Dr. Mortensen

## 2024-10-10 NOTE — ANESTHESIA POSTPROCEDURE EVALUATION
Department of Anesthesiology  Postprocedure Note    Patient: Jennifer Ramirez  MRN: [id number]  YOB: 1995  Date of evaluation: 10/9/2024    Procedure Summary       Date: 10/09/24 Room / Location: [facility] OR [address] [place] [address]    Anesthesia Start: 353 Anesthesia Stop:     Procedures:        SECTION      Labor Analgesia Diagnosis:       Non-reassuring fetal heart rate or rhythm affecting management of mother      (Non-reassuring fetal heart rate or rhythm affecting management of mother [O36.8390])    Surgeons: Renetta Mortensen DO Responsible Provider: Johan Riley MD    Anesthesia Type: epidural ASA Status: 2            Anesthesia Type: No value filed.    Matt Phase I: Matt Score: 10    Matt Phase II:      Anesthesia Post Evaluation    Patient location during evaluation: PACU  Patient participation: complete - patient participated  Level of consciousness: awake  Airway patency: patent  Nausea & Vomiting: no nausea and no vomiting  Cardiovascular status: blood pressure returned to baseline  Respiratory status: acceptable  Hydration status: euvolemic  Comments: BP (!) 95/51   Pulse 79   Temp 98.1 °F (36.7 °C) (Oral)   Resp 20   Ht 1.626 m (5' 4\")   Wt 101.6 kg (224 lb)   LMP 2024 (Exact Date)   SpO2 97%   Breastfeeding Unknown   BMI 38.45 kg/m²   Pain Assessment: 0-10 Pain Level: 5    No notable events documented.

## 2024-10-10 NOTE — CONSULTS
Breastfeeding packet given and reviewed, RN set up pumping supplies. Writer reviewed settings and frequency, measured at 17mm bilaterally. Pt has a pump at home. Offered assistance with latching baby in nicu.

## 2024-10-10 NOTE — CARE COORDINATION
Social Work     Sw reviewed medical record (current active problem list) and tox screens and found no current concerns.     Sw spoke with mom and dad briefly to explain Sw role, inquire if any needs or concerns, and provide safe sleep education and discuss.  Mom denied any needs or questions and informs baby has a safe sleep environment (bass).     Mom denied any current s/s of anxiety or depression and is aware to reach out to OB if any s/s occur after dc.     Mom reports a really good support system with many excited family members, and denied any current questions or needs.      Mom reports this is her 1st baby.       Mom states ped will be Dr. Yanes.      Sw encouraged parents to reach out if any issues or concerns arise.

## 2024-10-10 NOTE — FLOWSHEET NOTE
IVs covered, CHG soap instructions given, pt educated about use of red cord in shower, understanding verbalzed.  Pt up in shower at this time.

## 2024-10-10 NOTE — PROGRESS NOTES
Obstetric/Gynecology Resident Interval Note    Writer to bedside to evaluate patient after this mornings CBC shows Hgb of 7.5. Patient is laying comfortably in bed eating her lunch. Patient denies any signs or symptoms of anemia. Discussed venofer infusion with patient including risks, benefits and alternatives to infusion and patient is amenable. Patient instructed to inform us if she became symptomatic. Will order venofer for patient and continue to monitor. Rx for PO iron sent for patient on discharge.     Vitals:    10/10/24 0430 10/10/24 0545 10/10/24 0904 10/10/24 1235   BP: 126/77 105/62 92/60 132/74   Pulse: 85 90 84 90   Resp: 16 16 18 18   Temp: 97.5 °F (36.4 °C) 98.1 °F (36.7 °C) 98 °F (36.7 °C) 98.2 °F (36.8 °C)   TempSrc: Oral Oral Oral Oral   SpO2:  97% 97%    Weight:       Height:         Attending updated and in agreement with plan     Apple Rodriguez MD  OB/GYN Resident, PGY1  [place], [place]  10/10/2024, 12:42 PM

## 2024-10-10 NOTE — FLOWSHEET NOTE
RN walks patient and pt's significant other to NICU, pt utilizes walker/wheelchair combo and ambulates without difficulty

## 2024-10-10 NOTE — FLOWSHEET NOTE
Pt's  pulls cord in bathroom, RN to bedside.  Pt standing near bed at this time, states she was having some dizziness after getting out of shower and putting on underwear, RN instructs her to sit on bed, vitals obtained (WNL). Apple juice given to patient to sip on.

## 2024-10-11 VITALS
HEART RATE: 100 BPM | TEMPERATURE: 98.6 F | SYSTOLIC BLOOD PRESSURE: 100 MMHG | OXYGEN SATURATION: 99 % | RESPIRATION RATE: 18 BRPM | WEIGHT: 224 LBS | BODY MASS INDEX: 38.24 KG/M2 | DIASTOLIC BLOOD PRESSURE: 65 MMHG | HEIGHT: 64 IN

## 2024-10-11 LAB
HCT VFR BLD AUTO: 20.7 % (ref 36.3–47.1)
HCT VFR BLD AUTO: 22.5 % (ref 36.3–47.1)
HGB BLD-MCNC: 6.6 G/DL (ref 11.9–15.1)
HGB BLD-MCNC: 7.4 G/DL (ref 11.9–15.1)

## 2024-10-11 PROCEDURE — 6370000000 HC RX 637 (ALT 250 FOR IP)

## 2024-10-11 PROCEDURE — 85014 HEMATOCRIT: CPT

## 2024-10-11 PROCEDURE — P9016 RBC LEUKOCYTES REDUCED: HCPCS

## 2024-10-11 PROCEDURE — 2580000003 HC RX 258

## 2024-10-11 PROCEDURE — 6360000002 HC RX W HCPCS

## 2024-10-11 PROCEDURE — 85018 HEMOGLOBIN: CPT

## 2024-10-11 PROCEDURE — 36415 COLL VENOUS BLD VENIPUNCTURE: CPT

## 2024-10-11 PROCEDURE — 36430 TRANSFUSION BLD/BLD COMPNT: CPT

## 2024-10-11 RX ORDER — SODIUM CHLORIDE 9 MG/ML
INJECTION, SOLUTION INTRAVENOUS PRN
Status: DISCONTINUED | OUTPATIENT
Start: 2024-10-11 | End: 2024-10-11 | Stop reason: HOSPADM

## 2024-10-11 RX ADMIN — METRONIDAZOLE 500 MG: 500 TABLET ORAL at 14:21

## 2024-10-11 RX ADMIN — METRONIDAZOLE 500 MG: 500 TABLET ORAL at 05:34

## 2024-10-11 RX ADMIN — SODIUM CHLORIDE, PRESERVATIVE FREE 10 ML: 5 INJECTION INTRAVENOUS at 08:22

## 2024-10-11 RX ADMIN — ACETAMINOPHEN 1000 MG: 500 TABLET ORAL at 01:00

## 2024-10-11 RX ADMIN — IBUPROFEN 600 MG: 600 TABLET, FILM COATED ORAL at 05:34

## 2024-10-11 RX ADMIN — ENOXAPARIN SODIUM 40 MG: 100 INJECTION SUBCUTANEOUS at 08:21

## 2024-10-11 RX ADMIN — CEPHALEXIN 500 MG: 500 CAPSULE ORAL at 05:34

## 2024-10-11 RX ADMIN — SIMETHICONE 80 MG: 80 TABLET, CHEWABLE ORAL at 08:21

## 2024-10-11 RX ADMIN — ACETAMINOPHEN 1000 MG: 500 TABLET ORAL at 08:21

## 2024-10-11 RX ADMIN — Medication 1 TABLET: at 08:21

## 2024-10-11 RX ADMIN — CEPHALEXIN 500 MG: 500 CAPSULE ORAL at 14:21

## 2024-10-11 RX ADMIN — ACETAMINOPHEN 1000 MG: 500 TABLET ORAL at 14:21

## 2024-10-11 RX ADMIN — SENNOSIDES AND DOCUSATE SODIUM 2 TABLET: 50; 8.6 TABLET ORAL at 08:21

## 2024-10-11 RX ADMIN — IBUPROFEN 600 MG: 600 TABLET, FILM COATED ORAL at 14:21

## 2024-10-11 ASSESSMENT — PAIN DESCRIPTION - ORIENTATION
ORIENTATION: LOWER
ORIENTATION: LOWER

## 2024-10-11 ASSESSMENT — PAIN - FUNCTIONAL ASSESSMENT
PAIN_FUNCTIONAL_ASSESSMENT: ACTIVITIES ARE NOT PREVENTED
PAIN_FUNCTIONAL_ASSESSMENT: ACTIVITIES ARE NOT PREVENTED

## 2024-10-11 ASSESSMENT — PAIN SCALES - GENERAL
PAINLEVEL_OUTOF10: 4
PAINLEVEL_OUTOF10: 5

## 2024-10-11 ASSESSMENT — PAIN DESCRIPTION - LOCATION
LOCATION: INCISION
LOCATION: INCISION

## 2024-10-11 ASSESSMENT — PAIN DESCRIPTION - DESCRIPTORS
DESCRIPTORS: TENDER
DESCRIPTORS: TENDER;SORE

## 2024-10-11 NOTE — LACTATION NOTE
Assisted with a feeding in NICU. Helped with position and latch on the right breast, both in cradle and football hold. Taught gathering technique. Baby latched and did several bursts of sucking. Mom reports she has pumped a couple of time. Reviewed need to pump every 2-3 hours or 8-12 times in 24 hours. Breastfeeding discharge reviewed including feeding patterns, how to know baby is getting to the supply well, and comfort measures for engorgement. Encouraged her to call for an LC appointment, or to have questions answered.

## 2024-10-11 NOTE — PLAN OF CARE
Problem: Pain  Goal: Verbalizes/displays adequate comfort level or baseline comfort level  10/11/2024 1610 by Kelley Lopez, RN  Outcome: Progressing  10/11/2024 0548 by Natasha Poole, RN  Outcome: Progressing

## 2024-10-11 NOTE — PLAN OF CARE
Problem: Pain  Goal: Verbalizes/displays adequate comfort level or baseline comfort level  10/11/2024 1755 by Kelley Lopez, RN  Outcome: Completed  10/11/2024 1610 by Kelley Lopez RN  Outcome: Progressing  10/11/2024 0548 by Natasha Poole, RN  Outcome: Progressing

## 2024-10-11 NOTE — PROGRESS NOTES
CLINICAL PHARMACY NOTE: MEDS TO BEDS    Total # of Prescriptions Filled: 5   The following medications were delivered to the patient:  Ibuprofen  Senexon-s  Oxycodone  Acetaminophen  ferosul    Additional Documentation: Eva sampson

## 2024-10-12 LAB
ABO/RH: NORMAL
ANTIBODY SCREEN: NEGATIVE
ARM BAND NUMBER: NORMAL
BLOOD BANK BLOOD PRODUCT EXPIRATION DATE: NORMAL
BLOOD BANK DISPENSE STATUS: NORMAL
BLOOD BANK ISBT PRODUCT BLOOD TYPE: 8400
BLOOD BANK PRODUCT CODE: NORMAL
BLOOD BANK SAMPLE EXPIRATION: NORMAL
BLOOD BANK UNIT TYPE AND RH: NORMAL
BPU ID: NORMAL
COMPONENT: NORMAL
CROSSMATCH RESULT: NORMAL
TRANSFUSION STATUS: NORMAL
UNIT DIVISION: 0
UNIT ISSUE DATE/TIME: NORMAL

## 2024-10-14 ENCOUNTER — CARE COORDINATION (OUTPATIENT)
Dept: OTHER | Facility: CLINIC | Age: 29
End: 2024-10-14

## 2024-10-14 NOTE — CARE COORDINATION
Call within 2 business days of discharge: Yes     Patient Current Location: Home: [address] [place] [address] [place] [address]    Last Discharge Facility       Date Complaint Diagnosis Description Type Department Provider    10/8/24 Contractions PLTCS 10/9/24 M Apg  Wt 7#0 Admission (Discharged) [facility] Mary Romero DO            Maternity Care Manager contacted the patient by telephone to discuss the maternity management program.  Patient agrees to care management services at this time. Verified name and  with patient as identifiers.     Risk Factors Identified:  None      Needs to be reviewed by the provider   none         Method of communication with provider : none    Advance Care Planning:   Does patient have an Advance Directive:  reviewed and current.     Does patient have OB/Gyn Selected? Yes    Discussed follow up appointments. If no appointment was previously scheduled, appointment scheduling offered: Yes  [facility] follow up appointment(s):   Future Appointments   Date Time Provider Department Center   10/16/2024  8:45 AM Jazmyne Burnham MD [facility]   10/25/2024  8:45 AM Mary Romero DO [facility]   2024  2:15 PM Mary Romero DO [facility]     Non-[facility] follow up appointment(s): n/a    OB History:   OB History    Para Term  AB Living   1 1 1     1   SAB IAB Ectopic Molar Multiple Live Births           0 1      # Outcome Date GA Lbr Gio/2nd Weight Sex Type Anes PTL Lv   1 Term 10/09/24 39w1d  3.19 kg (7 lb 0.5 oz) M CS-LTranv EPI N ADI      Complications: Failure to Progress in First Stage       39w1d    Medication reconciliation was performed with parent, who verbalizes understanding of administration of home medications. Advised obtaining a 90-day supply of all daily and as-needed medications.     Barriers/Support system:  patient and spouse  Return to work planning? At 16 weeks PP          Postpartum

## 2024-10-15 LAB — SURGICAL PATHOLOGY REPORT: NORMAL

## 2024-10-15 NOTE — PROGRESS NOTES
Births   0 0 0 0 0 1           Weight 97.1 kg (214 lb), last menstrual period 2024, currently breastfeeding.    Abdomen: Soft and non-tender; good bowel sounds; no guarding, rebound or rigidity; no CVA tenderness bilaterally.    Incision: Clean, Dry and Intact without signs or symptoms of infection.    Extremities: No calf tenderness bilaterally. DTR 2/4 bilaterally. No edema.      Assessment:   Diagnosis Orders   1. Postpartum care following  delivery          Chief Complaint   Patient presents with    Postpartum Care     10/9/24 [facility]     EPDS Score of 5        Plan:  1. Return to the office in 3-4 weeks  2. Signs & Symptoms of mastitis reviewed; notify if occurs  3. Secondary smoke risks reviewed. Increased risks of respiratory problems, Sudden infant death syndrome, and potential malignancies.  4. Abstinence  5. Family planning counseling and STD counseling completed.  [employer]  6. Continue with post operative restrictions  7. No lifting or [place]      Patient was seen with total face to face time of 15 minutes. More than 50% of this visit was on counseling and education regarding her    Diagnosis Orders   1. Postpartum care following  delivery         and her options. She was also counseled on her preventative health maintenance recommendations and follow-up.     Jazmyne Burnham MD

## 2024-10-16 ENCOUNTER — POSTPARTUM VISIT (OUTPATIENT)
Dept: OBGYN CLINIC | Age: 29
End: 2024-10-16

## 2024-10-16 VITALS — WEIGHT: 214 LBS | BODY MASS INDEX: 36.73 KG/M2

## 2024-10-16 PROCEDURE — 99024 POSTOP FOLLOW-UP VISIT: CPT | Performed by: OBSTETRICS & GYNECOLOGY

## 2024-10-25 ENCOUNTER — POSTPARTUM VISIT (OUTPATIENT)
Dept: OBGYN CLINIC | Age: 29
End: 2024-10-25

## 2024-10-25 VITALS
DIASTOLIC BLOOD PRESSURE: 78 MMHG | WEIGHT: 201.6 LBS | HEART RATE: 88 BPM | SYSTOLIC BLOOD PRESSURE: 125 MMHG | BODY MASS INDEX: 34.6 KG/M2

## 2024-10-25 NOTE — PROGRESS NOTES
Postpartum Visit    Jennifer Ramirez is a 29 y.o. female , 2 weeks Post Partum s/p PL TCS on 10/9/2024    The patient was seen. She has no chief complaint today.    She is breast pumping and denies signs or symptoms of mastitis.  The patient completed the E.P.D.S. Post Partum Depression Evaluation form and EPDS Score of 4. She does not have signs or symptoms of post partum depression. She denies any suicidal thoughts with a plan, intent to harm others, and delusional ideas. She does admit to having good home support. Today her lochia is light she denies any dizziness or shortness of breath.  Her bowels are regular and she denies any urinary tract symptomology. She is currently using abstinence to prevent pregnancy.  She may want to start contraceptive pills in the future.    Her pregnancy was complicated by:  Patient Active Problem List    Diagnosis Date Noted    Arrest of dilation, delivered, current hospitalization 10/09/2024    S/P  section 10/09/2024    PLTCS 10/9/24 M Apg  Wt 7#0 10/09/2024     Overview Note:     Arrest of dilation/Category 2      Rh+/RI/GBSpos 10/08/2024    Elevated BP without diagnosis of hypertension 10/01/2024     Overview Note:     148/90 and 150/84 on 10/1/24. Sent to L&D for Pre E work up.      GBS (group B Streptococcus carrier), +RV culture, currently pregnant 2024     Overview Note:     PCN G in labor      Lesion of cervix 2024     Overview Note:     24:  Small friable cystic lesions noted at 8 o'clock and 12 o'clock most consistent with cervical ectropion but somewhat vesicular like HSV  - No hx of HSV  - Denies s/sx   - Low clinical concern, but swabs obtained out of an abundance of caution  Herpes swab negative        Nuchal fold thickening on prenatal ultrasound 2024     Overview Note:     NIPT Low Risk         Vitals:   Blood pressure 125/78, pulse 88, weight 91.4 kg (201 lb 9.6 oz), last menstrual period 2024, currently

## 2024-11-11 ENCOUNTER — CARE COORDINATION (OUTPATIENT)
Dept: OTHER | Facility: CLINIC | Age: 29
End: 2024-11-11

## 2024-11-11 NOTE — CARE COORDINATION
Goal progress and follow-up appointment scheduling    Summary Note: S/w patient today for PP follow-up call. This is patient's first baby  Patient did participate in BWWB and did participate in Maternity Education.   Spouse and family at home helping during PP period.  Patient reports no concerns for weight loss for infant.  Infant was seen by Peds already and will be seen again in a few weeks. Patient reports that her milk did come in and does not feel the need for additional lactation support at this time. Baby's name is Terrell. Patient has reached out to [employer] to update on delivery details. Baby's primary insurance will be mom's. Today patient states that she is doing well without any chief complaint. Patient is now 4 weeks PP following a c/secition delivery. Her lochia is resolved. She denies chest pain, shortness of breath, headache, lightheadedness, and blurred vision. She is breast feeding, and she denies any signs or symptoms of mastitis. She is ambulating well with no concerns regarding ADL's. She is voiding without difficulty. She currently denies s/s of postpartum depression. Bowel movement and flatus are present and normal. She is tolerating solids with no concerns related to nutrition noted. Counseling related to secondary smoke risks and sudden infant death syndrome (SIDS) were reviewed. Education r/t DESTINY benefit(s) provided. Infant sleeping, \"back to sleep\" and avoidance of co-sleeping recommendations were reviewed. Signs and symptoms of Post-Partum Depression were reviewed. The patient will call OBGYN provider or ACM if any occur. Signs and symptoms of Mastitis were reviewed. The patient is to call OBGYN provider or ACM if any occur. Discharge instructions reviewed, instructions include pelvic rest, incision care if applicable, weight restrictions and driving restrictions (no driving with pain medicine). Social determinants of health impacting care: None. Shared Decision Making completed with

## 2024-11-20 ENCOUNTER — POSTPARTUM VISIT (OUTPATIENT)
Dept: OBGYN CLINIC | Age: 29
End: 2024-11-20

## 2024-11-20 VITALS
SYSTOLIC BLOOD PRESSURE: 111 MMHG | WEIGHT: 204.4 LBS | HEART RATE: 82 BPM | BODY MASS INDEX: 35.09 KG/M2 | DIASTOLIC BLOOD PRESSURE: 74 MMHG

## 2024-11-20 DIAGNOSIS — N94.6 DYSMENORRHEA: Primary | ICD-10-CM

## 2024-11-20 PROCEDURE — 0503F POSTPARTUM CARE VISIT: CPT | Performed by: STUDENT IN AN ORGANIZED HEALTH CARE EDUCATION/TRAINING PROGRAM

## 2024-11-20 NOTE — PROGRESS NOTES
Postpartum Visit    Jennifer Ramirez is a 29 y.o. female , 6 weeks Post Partum s/p primary low-transverse  on 10/9/2024    The patient was seen. She has no chief complaint today.    She is  breast feeding and denies signs or symptoms of mastitis.  The patient completed the E.P.D.S. Post Partum Depression Evaluation form and EPDS Score of 4. She does not have signs or symptoms of post partum depression. She denies any suicidal thoughts with a plan, intent to harm others, and delusional ideas. She does admit to having good home support. Today her lochia is light she denies any dizziness or shortness of breath.  Her bowels are regular and she denies any urinary tract symptomology.  She plans to use oral contraceptives for bleeding control and contraception.    Her pregnancy was complicated by:  Patient Active Problem List    Diagnosis Date Noted    Arrest of dilation, delivered, current hospitalization 10/09/2024    S/P  section 10/09/2024    PLTCS 10/9/24 M Apg  Wt 7#0 10/09/2024     Overview Note:     Arrest of dilation/Category 2      Rh+/RI/GBSpos 10/08/2024    Elevated BP without diagnosis of hypertension 10/01/2024     Overview Note:     148/90 and 150/84 on 10/1/24. Sent to L&D for Pre E work up.      GBS (group B Streptococcus carrier), +RV culture, currently pregnant 2024     Overview Note:     PCN G in labor      Lesion of cervix 2024     Overview Note:     24:  Small friable cystic lesions noted at 8 o'clock and 12 o'clock most consistent with cervical ectropion but somewhat vesicular like HSV  - No hx of HSV  - Denies s/sx   - Low clinical concern, but swabs obtained out of an abundance of caution  Herpes swab negative        Nuchal fold thickening on prenatal ultrasound 2024     Overview Note:     NIPT Low Risk         Vitals:   Blood pressure 111/74, pulse 82, weight 92.7 kg (204 lb 6.4 oz), last menstrual period 2024, currently

## 2024-11-25 ENCOUNTER — CARE COORDINATION (OUTPATIENT)
Dept: OTHER | Facility: CLINIC | Age: 29
End: 2024-11-25

## 2024-11-25 NOTE — CARE COORDINATION
Ambulatory Care Coordination Note    ACM attempted 2nd outreach to patient for Associate Care Management related to PP follow-up call. HIPAA compliant message left requesting a return phone call at patient convenience.     Unable to Reach Letter sent to patient via [employer].    Will continue to outreach.      No future appointments.      ROSALINA Rice, RN  Associate Care Manager   Cell: [phone]  [email or web address]

## 2025-01-03 ENCOUNTER — TELEPHONE (OUTPATIENT)
Dept: OBGYN CLINIC | Age: 30
End: 2025-01-03

## 2025-01-03 NOTE — TELEPHONE ENCOUNTER
Patient requesting samples of slynd. Rx is still not going through mail order pharmacy.       Abbey VALDEZ

## 2025-01-16 ENCOUNTER — CARE COORDINATION (OUTPATIENT)
Dept: OTHER | Facility: CLINIC | Age: 30
End: 2025-01-16

## 2025-02-19 ENCOUNTER — OFFICE VISIT (OUTPATIENT)
Dept: OBGYN CLINIC | Age: 30
End: 2025-02-19
Payer: COMMERCIAL

## 2025-02-19 VITALS
BODY MASS INDEX: 37.59 KG/M2 | HEART RATE: 88 BPM | SYSTOLIC BLOOD PRESSURE: 115 MMHG | WEIGHT: 220.2 LBS | HEIGHT: 64 IN | DIASTOLIC BLOOD PRESSURE: 80 MMHG

## 2025-02-19 DIAGNOSIS — Z01.419 ENCOUNTER FOR WELL WOMAN EXAM WITH ROUTINE GYNECOLOGICAL EXAM: Primary | ICD-10-CM

## 2025-02-19 DIAGNOSIS — N94.6 DYSMENORRHEA: ICD-10-CM

## 2025-02-19 PROCEDURE — 99395 PREV VISIT EST AGE 18-39: CPT | Performed by: STUDENT IN AN ORGANIZED HEALTH CARE EDUCATION/TRAINING PROGRAM

## 2025-02-19 NOTE — PROGRESS NOTES
[facility] Annual Visit    Jennifer Ramirez  2023                       Primary Care Physician: Unknown, Provider, ANP    CC:   Chief Complaint   Patient presents with    Annual Exam     Last pap 11.2.23 WNL  HPV 11.2.22 Neg         HPI: Jennifer Ramirez is a 29 y.o. female     The patient was seen and examined. She is here for an annual visit.  Patient has no complaints today.    No LMP recorded..  Patient stopped breast-feeding in January.  She still has not had a cycle since delivery and stopping breast-feeding.  She has been using Slynd for contraceptive.    She is sexually active.  She does not have any sexual health concerns.  Patient would like to conceive again soon.  Recommend 18 months from delivery to delivery with history of .  Patient can safely try to conceive near  or September    REVIEW OF SYSTEMS:   Constitutional: negative fever, negative chills   HEENT: negative visual disturbances, negative headaches  Respiratory: negative dyspnea, negative cough  Cardiovascular: negative chest pain,  negative palpitations  Gastrointestinal: negative abdominal pain, negative RUQ pain, negative N/V, negative diarrhea, negative constipation  Genitourinary: negative dysuria, negative vaginal discharge  Dermatological: negative rash  Hematologic: negative bruising  Immunologic/Lymphatic: negative recent illness, negative recent sick contact  Musculoskeletal: negative back pain, negative myalgias, negative arthralgias  Neurological:  negative dizziness, negative weakness  Behavior/Psych: negative depression, negative anxiety    GYNECOLOGICAL HISTORY:  Age of Menopause: N/A    Sexually Active: sexually active with 1 male partner    STD History: Denies  Pap History: last pap was  and normal  Colposcopy History: Denies  Permanent Sterilization: no  Reversible Birth Control: yes -Slynd  Hormone Replacement Exposure: N/A     OBSTETRICAL HISTORY:  OB History    Para Term

## 2025-06-09 LAB
CHOLEST SERPL-MCNC: 133 MG/DL (ref 0–199)
CHOLESTEROL/HDL RATIO: 3.5
GLUCOSE SERPL-MCNC: 93 MG/DL (ref 74–99)
HDLC SERPL-MCNC: 38 MG/DL
LDLC SERPL CALC-MCNC: 83 MG/DL (ref 0–100)
PATIENT FASTING?: YES
TRIGL SERPL-MCNC: 61 MG/DL
VLDLC SERPL CALC-MCNC: 12 MG/DL (ref 1–30)

## (undated) DEVICE — SOLUTION SOD CHL 0.9% 1000ML

## (undated) DEVICE — SUTURE VICRYL SZ 4-0 L18IN ABSRB UD L19MM PS-2 3/8 CIR PRIM J496H

## (undated) DEVICE — 450 ML BOTTLE OF 0.05% CHLORHEXIDINE GLUCONATE IN 99.95% STERILE WATER FOR IRRIGATION, USP AND APPLICATOR.: Brand: IRRISEPT ANTIMICROBIAL WOUND LAVAGE

## (undated) DEVICE — 3M™ STERI-STRIP™ ANTIMICROBIAL SKIN CLOSURES 1 IN X 5 IN, 25/CAR, 4 CAR/CASE A1848: Brand: 3M™ STERI-STRIP™

## (undated) DEVICE — TOWEL SURG W16XL26IN WHT NONFENESTRATED ST 2 PER PK

## (undated) DEVICE — SUTURE VICRYL COAT SZ 0 L36IN ABSRB VLT CTX L48MM TAPERPOINT J370H

## (undated) DEVICE — STERILE POLYISOPRENE POWDER-FREE SURGICAL GLOVES WITH EMOLLIENT COATING: Brand: PROTEXIS

## (undated) DEVICE — TRAY SPNL 24GA L4IN PENCAN PNCL PNT NDL 0.75% BIPIVCAIN W/

## (undated) DEVICE — SOLUTION IRRIG 500ML STRL H2O NONPYROGENIC

## (undated) DEVICE — Device

## (undated) DEVICE — PREVENA INCISION MANAGEMENT SYSTEM- PEEL & PLACE DRESSING: Brand: PREVENA™ PEEL & PLACE™

## (undated) DEVICE — KENDALL SCD EXPRESS SLEEVES, KNEE LENGTH, MEDIUM: Brand: KENDALL SCD

## (undated) DEVICE — SUTURE VICRYL SZ 0 L36IN ABSRB UD L36MM CT-1 1/2 CIR J946H

## (undated) DEVICE — MASTISOL ADHESIVE LIQ 2/3ML